# Patient Record
Sex: MALE | Race: WHITE | NOT HISPANIC OR LATINO | Employment: OTHER | URBAN - NONMETROPOLITAN AREA
[De-identification: names, ages, dates, MRNs, and addresses within clinical notes are randomized per-mention and may not be internally consistent; named-entity substitution may affect disease eponyms.]

---

## 2021-03-04 ENCOUNTER — OFFICE VISIT (OUTPATIENT)
Dept: INTERNAL MEDICINE | Facility: CLINIC | Age: 66
End: 2021-03-04

## 2021-03-04 VITALS
HEIGHT: 72 IN | TEMPERATURE: 97.9 F | DIASTOLIC BLOOD PRESSURE: 84 MMHG | RESPIRATION RATE: 18 BRPM | OXYGEN SATURATION: 98 % | HEART RATE: 87 BPM | SYSTOLIC BLOOD PRESSURE: 151 MMHG | WEIGHT: 251.06 LBS | BODY MASS INDEX: 34.01 KG/M2

## 2021-03-04 DIAGNOSIS — E11.9 TYPE 2 DIABETES MELLITUS WITHOUT COMPLICATION, WITHOUT LONG-TERM CURRENT USE OF INSULIN (HCC): ICD-10-CM

## 2021-03-04 DIAGNOSIS — H65.93 MIDDLE EAR EFFUSION, BILATERAL: ICD-10-CM

## 2021-03-04 DIAGNOSIS — E78.5 HYPERLIPIDEMIA, UNSPECIFIED HYPERLIPIDEMIA TYPE: Primary | ICD-10-CM

## 2021-03-04 DIAGNOSIS — Z12.11 SCREENING FOR COLON CANCER: ICD-10-CM

## 2021-03-04 PROCEDURE — 99204 OFFICE O/P NEW MOD 45 MIN: CPT | Performed by: NURSE PRACTITIONER

## 2021-03-04 RX ORDER — ROSUVASTATIN CALCIUM 40 MG/1
TABLET, COATED ORAL
COMMUNITY
Start: 2021-02-25 | End: 2022-03-08 | Stop reason: SDUPTHER

## 2021-03-04 RX ORDER — FLUTICASONE PROPIONATE 50 MCG
2 SPRAY, SUSPENSION (ML) NASAL DAILY
Qty: 9.9 ML | Refills: 0 | Status: SHIPPED | OUTPATIENT
Start: 2021-03-04 | End: 2022-02-24

## 2021-03-04 RX ORDER — LOSARTAN POTASSIUM AND HYDROCHLOROTHIAZIDE 25; 100 MG/1; MG/1
TABLET ORAL
COMMUNITY
Start: 2021-02-25 | End: 2022-03-08 | Stop reason: SDUPTHER

## 2021-03-04 RX ORDER — ESOMEPRAZOLE MAGNESIUM 40 MG/1
CAPSULE, DELAYED RELEASE ORAL
COMMUNITY
Start: 2021-02-25 | End: 2022-03-08 | Stop reason: SDUPTHER

## 2021-03-04 RX ORDER — FENOFIBRATE 145 MG/1
TABLET, COATED ORAL
COMMUNITY
Start: 2021-02-25 | End: 2022-03-08 | Stop reason: SDUPTHER

## 2021-03-04 RX ORDER — CETIRIZINE HYDROCHLORIDE 10 MG/1
10 TABLET ORAL DAILY
Qty: 30 TABLET | Refills: 2 | Status: SHIPPED | OUTPATIENT
Start: 2021-03-04 | End: 2022-02-24

## 2021-03-04 NOTE — PROGRESS NOTES
"        Subjective     Chief Complaint   Patient presents with   • Ear Fullness     Left and Right. ( Left is worse)       History of Present Illness  Pt comes in today to establish care. He carries a history of hypertension, hyperlipidemia, and GERD.  Patient also complains of ear \"fullness.\" Patient reports that he is still able to hear most things, however has difficulty hearing low pitched sounds.  Denies pain to ears.  Patient reports medications which were previously prescribed and followed by Lisa Valiente NP.  Taking his medications \"most days.\"      Last set of labs, approximately 1 month ago, per [employer], labs faxed and received in clinic today.  Reviewed, GFR 67, ALT AST within normal limits, PSA 0.3, cholesterol 180, triglycerides 127, hemoglobin A1c 7.5. Patient reports a decrease in hearing loss, and states that he might be on his way to getting hearing aids.   Reports that he is approximately a pack a day smoker, drinks anywhere between 2-10 beers a day.  Reports that he likes to \"get in the snack drawers in the middle of the night.\"Patient's PMR from outside medical facility reviewed and noted.    Review of Systems   Constitutional: Negative for activity change, appetite change, chills and fever.   HENT: Positive for hearing loss. Negative for nosebleeds, tinnitus and trouble swallowing.         Bilateral ear \"fullness and trouble hearing low pitched sounds\"   Eyes: Negative for visual disturbance.   Respiratory: Negative for cough, chest tightness, shortness of breath and wheezing.    Cardiovascular: Negative for chest pain, palpitations and leg swelling.   Gastrointestinal: Negative for abdominal distention, abdominal pain, blood in stool, constipation, diarrhea, nausea and vomiting.   Endocrine: Negative for cold intolerance, heat intolerance, polydipsia, polyphagia and polyuria.   Genitourinary: Negative for decreased urine volume, difficulty urinating, dysuria, flank pain, frequency " "and hematuria.   Musculoskeletal: Negative for arthralgias, joint swelling and myalgias.   Skin: Negative for rash.   Allergic/Immunologic: Negative for immunocompromised state.   Neurological: Negative for dizziness, syncope, weakness, light-headedness and headaches.   Hematological: Negative for adenopathy. Does not bruise/bleed easily.   Psychiatric/Behavioral: Negative for confusion and sleep disturbance. The patient is not nervous/anxious.         Otherwise complete ROS reviewed and negative except as mentioned in the HPI.    Past Medical History:   Past Medical History:   Diagnosis Date   • Diabetes mellitus (CMS/HCC)    • Hyperlipidemia    • Hypertension      Past Surgical History:History reviewed. No pertinent surgical history.  Social History:  reports that he has been smoking. He has never used smokeless tobacco. He reports current alcohol use. He reports that he does not use drugs.    Family History: family history includes Cancer in his mother; Hypertension in his mother.      Allergies:  No Known Allergies  Medications:  Prior to Admission medications    Medication Sig Start Date End Date Taking? Authorizing Provider   esomeprazole (nexIUM) 40 MG capsule  2/25/21   Ruben Nathan MD   fenofibrate (TRICOR) 145 MG tablet  2/25/21   Ruben Nathan MD   losartan-hydrochlorothiazide (HYZAAR) 100-25 MG per tablet  2/25/21   Ruben Nathan MD   metFORMIN (GLUCOPHAGE) 1000 MG tablet  2/25/21   Ruben Nathan MD   rosuvastatin (CRESTOR) 40 MG tablet  2/25/21   Ruben Nathan MD       Objective     Vital Signs: /84 (BP Location: Left arm, Patient Position: Sitting, Cuff Size: Adult)   Pulse 87   Temp 97.9 °F (36.6 °C) (Skin)   Resp 18   Ht 182.9 cm (72\")   Wt 114 kg (251 lb 1 oz)   SpO2 98%   BMI 34.05 kg/m²   Physical Exam  Vitals signs reviewed.   Constitutional:       Appearance: He is well-developed.   HENT:      Head: Normocephalic and atraumatic.      Ears:    "   Comments: Approximately 1.5 cm lesion noted to external ear.  Recommended to patient follow-up with dermatology.  Moderate amount of fluid present in both right and left ears.  Cerumen noted, no impaction.      Nose: No congestion.   Eyes:      General: No scleral icterus.     Pupils: Pupils are equal, round, and reactive to light.   Neck:      Musculoskeletal: Normal range of motion and neck supple.      Thyroid: No thyromegaly.      Vascular: No carotid bruit or JVD.      Trachea: No tracheal deviation.   Cardiovascular:      Rate and Rhythm: Normal rate and regular rhythm.      Heart sounds: No murmur. No friction rub. No gallop.    Pulmonary:      Effort: Pulmonary effort is normal. No respiratory distress.      Breath sounds: Normal breath sounds. No wheezing or rales.   Chest:      Chest wall: No tenderness.   Abdominal:      General: Bowel sounds are normal. There is no distension.      Palpations: Abdomen is soft.      Tenderness: There is no abdominal tenderness.   Musculoskeletal: Normal range of motion.         General: No deformity.   Lymphadenopathy:      Cervical: No cervical adenopathy.   Skin:     General: Skin is warm and dry.      Findings: No rash.   Neurological:      Mental Status: He is alert and oriented to person, place, and time.      Cranial Nerves: No cranial nerve deficit.   Psychiatric:         Behavior: Behavior normal.         Thought Content: Thought content normal.         Judgment: Judgment normal.       Patient's Body mass index is 34.05 kg/m². BMI is above normal parameters. Recommendations include: educational material and nutrition counseling.    Results Reviewed:  No results found for: GLUCOSE, BUN, CREATININE, NA, K, CL, CO2, CALCIUM, ALT, AST, WBC, HCT, PLT, CHOL, TRIG, HDL, LDL, LDLHDL, HGBA1C      Assessment / Plan     Assessment/Plan:  Diagnoses and all orders for this visit:    1. Hyperlipidemia, unspecified hyperlipidemia type (Primary)  Stable; continue Crestor and  Tricor  2. Type 2 diabetes mellitus without complication, without long-term current use of insulin (CMS/[facility], [facility])  Stable; will continue to monitor  3. Middle ear effusion, bilateral  -     fluticasone (Flonase) 50 MCG/ACT nasal spray; 2 sprays into the nostril(s) as directed by provider Daily.  Dispense: 9.9 mL; Refill: 0  -     cetirizine (zyrTEC) 10 MG tablet; Take 1 tablet by mouth Daily.  Dispense: 30 tablet; Refill: 2  4. Screening for colon cancer  -     Cologuard - Stool, Per Rectum; Future      Nutrition, physical activity, healthy way, receives discussed with patient.  Reports that he had a colonoscopy approximately 10 years ago and would like to try cologuard.  Patient counseled on diabetes management, the importance of monitoring blood glucose levels.    Return in about 1 month (around 4/4/2021) for Annual physical. unless patient needs to be seen sooner or acute issues arise.    Code Status: Full.     I have discussed the patient results/orders and and plan/recommendation with them at today's visit.      Melinda Olivares, APRN   03/04/2021

## 2021-03-08 RX ORDER — PREDNISONE 20 MG/1
20 TABLET ORAL DAILY
Qty: 7 TABLET | Refills: 0 | Status: SHIPPED | OUTPATIENT
Start: 2021-03-08 | End: 2022-02-24

## 2021-04-07 ENCOUNTER — OFFICE VISIT (OUTPATIENT)
Dept: INTERNAL MEDICINE | Facility: CLINIC | Age: 66
End: 2021-04-07

## 2021-04-07 VITALS
TEMPERATURE: 97.9 F | HEART RATE: 74 BPM | DIASTOLIC BLOOD PRESSURE: 86 MMHG | SYSTOLIC BLOOD PRESSURE: 136 MMHG | HEIGHT: 72 IN | RESPIRATION RATE: 18 BRPM | BODY MASS INDEX: 33.86 KG/M2 | WEIGHT: 250 LBS | OXYGEN SATURATION: 98 %

## 2021-04-07 DIAGNOSIS — Z11.59 ENCOUNTER FOR HEPATITIS C SCREENING TEST FOR LOW RISK PATIENT: ICD-10-CM

## 2021-04-07 DIAGNOSIS — H91.93 BILATERAL HEARING LOSS, UNSPECIFIED HEARING LOSS TYPE: ICD-10-CM

## 2021-04-07 DIAGNOSIS — Z00.00 ANNUAL PHYSICAL EXAM: Primary | ICD-10-CM

## 2021-04-07 DIAGNOSIS — E11.9 TYPE 2 DIABETES MELLITUS WITHOUT COMPLICATION, WITHOUT LONG-TERM CURRENT USE OF INSULIN (HCC): ICD-10-CM

## 2021-04-07 PROBLEM — I10 HYPERTENSIVE DISORDER: Status: ACTIVE | Noted: 2021-04-07

## 2021-04-07 PROBLEM — J06.9 UPPER RESPIRATORY INFECTION: Status: ACTIVE | Noted: 2021-04-07

## 2021-04-07 PROBLEM — R05.9 COUGH: Status: ACTIVE | Noted: 2021-04-07

## 2021-04-07 PROBLEM — E78.5 HYPERLIPIDEMIA: Status: ACTIVE | Noted: 2021-04-07

## 2021-04-07 PROBLEM — K21.9 GASTROESOPHAGEAL REFLUX DISEASE: Status: ACTIVE | Noted: 2021-04-07

## 2021-04-07 PROCEDURE — 99396 PREV VISIT EST AGE 40-64: CPT | Performed by: NURSE PRACTITIONER

## 2021-04-07 RX ORDER — RANITIDINE 300 MG/1
TABLET ORAL
COMMUNITY
End: 2021-04-07 | Stop reason: SDUPTHER

## 2021-04-07 RX ORDER — ATORVASTATIN CALCIUM 20 MG/1
TABLET, FILM COATED ORAL
COMMUNITY
End: 2022-02-24

## 2021-04-07 NOTE — PROGRESS NOTES
Subjective     Chief Complaint   Patient presents with   • Annual Exam       History of Present Illness  Pt comes in for his annual exam. He is doing well on Zyrtec. Still has some issues with hearing. States he has chronic hearing loss. Is interested in maybe getting hearing aids.     Patient's PMR from outside medical facility reviewed and noted.    Review of Systems   Constitutional: Negative for diaphoresis and fatigue.   HENT: Negative for ear pain and postnasal drip.    Eyes: Negative for visual disturbance ( last eye exam was 1/2021).   Respiratory: Negative for cough and shortness of breath.    Cardiovascular: Negative for chest pain and palpitations.   Gastrointestinal: Negative for anal bleeding, constipation and diarrhea.        Cologuard pending.    Endocrine: Negative for polydipsia, polyphagia and polyuria ( last A1C 7.5%).   Genitourinary: Positive for frequency.   Musculoskeletal: Positive for joint swelling ( knee-chronic). Negative for back pain.   Skin: Negative.    Allergic/Immunologic: Negative for immunocompromised state.   Neurological: Negative for dizziness and headaches.   Hematological: Does not bruise/bleed easily.   Psychiatric/Behavioral: Negative for confusion. The patient is not nervous/anxious and is not hyperactive.         Past Medical History:   Past Medical History:   Diagnosis Date   • Diabetes mellitus (CMS/[facility])    • Hyperlipidemia    • Hypertension      Past Surgical History:History reviewed. No pertinent surgical history.  Social History:  reports that he has been smoking. He has never used smokeless tobacco. He reports current alcohol use. He reports that he does not use drugs.    Family History: family history includes Cancer in his mother; Hypertension in his mother.      Allergies:  Allergies   Allergen Reactions   • Lisinopril Cough     Medications:  Prior to Admission medications    Medication Sig Start Date End Date Taking? Authorizing Provider   atorvastatin  "(LIPITOR) 20 MG tablet atorvastatin 20 mg tablet   TAKE 1 TABLET DAILY FOR CHOLESTEROL    Provider, MD Ruben   cetirizine (zyrTEC) 10 MG tablet Take 1 tablet by mouth Daily. 3/4/21   Melinda Olivares APRN   esomeprazole (nexIUM) 40 MG capsule  2/25/21   Ruben Nathan MD   fenofibrate (TRICOR) 145 MG tablet  2/25/21   Ruben Nathan MD   fluticasone (Flonase) 50 MCG/ACT nasal spray 2 sprays into the nostril(s) as directed by provider Daily. 3/4/21   Melinda Olivares APRN   losartan-hydrochlorothiazide (HYZAAR) 100-25 MG per tablet  2/25/21   Ruben Nathan MD   metFORMIN (GLUCOPHAGE) 1000 MG tablet  2/25/21   Ruben Nathan MD   predniSONE (DELTASONE) 20 MG tablet Take 1 tablet by mouth Daily. 3/8/21   Melinda Olivares APRN   rosuvastatin (CRESTOR) 40 MG tablet  2/25/21   Ruben Nathan MD   raNITIdine (ZANTAC) 300 MG tablet ranitidine 300 mg tablet  4/7/21  Ruben Nathan MD       Objective     Vital Signs: /86   Pulse 74   Temp 97.9 °F (36.6 °C) (Skin)   Resp 18   Ht 182.9 cm (72\")   Wt 113 kg (250 lb)   SpO2 98%   BMI 33.91 kg/m²   Physical Exam  Vitals reviewed.   Constitutional:       Appearance: He is well-developed. He is obese.   HENT:      Head: Normocephalic and atraumatic.      Right Ear: Tympanic membrane normal.      Left Ear: Tympanic membrane normal.   Eyes:      General: No scleral icterus.     Conjunctiva/sclera: Conjunctivae normal.      Pupils: Pupils are equal, round, and reactive to light.   Neck:      Vascular: No JVD.   Cardiovascular:      Rate and Rhythm: Normal rate and regular rhythm.      Heart sounds: Normal heart sounds. No murmur heard.   No friction rub. No gallop.    Pulmonary:      Effort: Pulmonary effort is normal.      Breath sounds: Normal breath sounds. No wheezing or rales.   Abdominal:      General: Bowel sounds are normal. There is no distension.      Palpations: Abdomen is soft. There is no " mass.      Tenderness: There is no abdominal tenderness. There is no guarding or rebound.   Musculoskeletal:         General: No deformity. Normal range of motion.      Cervical back: Normal range of motion and neck supple.      Comments: No cyanosis or clubbing   Lymphadenopathy:      Cervical: No cervical adenopathy.   Skin:     General: Skin is warm and dry.   Neurological:      Mental Status: He is alert and oriented to person, place, and time.      Cranial Nerves: No cranial nerve deficit.      Deep Tendon Reflexes: Reflexes normal.   Psychiatric:         Behavior: Behavior normal.         Thought Content: Thought content normal.         Judgment: Judgment normal.     Diabetic foot exam:   Left: Filament test present   Pulses Dorsalis Pedis:  present   Reflexes 3+    Vibratory sensation normal   Proprioception normal   Sharp/dull discrimination normal       Right: Filament test present   Pulses Dorsalis Pedis:  present   Reflexes 3+    Vibratory sensation normal   Proprioception normal   Sharp/dull discrimination normal      Patient's Body mass index is 33.91 kg/m². BMI is above normal parameters. Recommendations include: educational material, exercise counseling and nutrition counseling.      Results Reviewed:  No results found for: GLUCOSE, BUN, CREATININE, NA, K, CL, CO2, CALCIUM, ALT, AST, WBC, HCT, PLT, CHOL, TRIG, HDL, LDL, LDLHDL, HGBA1C      Assessment / Plan     Assessment/Plan:  Diagnoses and all orders for this visit:    1. Annual physical exam (Primary)  -     pneumococcal conj. 13-valent (PREVNAR-13) vaccine 0.5 mL    2. Encounter for hepatitis C screening test for low risk patient  -     Hepatitis C antibody    3. Type 2 diabetes mellitus without complication, without long-term current use of insulin (CMS/HCC)  -     MicroAlbumin, Urine, Random - Urine, Clean Catch    4. 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 Encouraged testicular exam Discussed weight management and importance of maintaining a healthy weight. Discussed Vitamin D intake and the importance of adequate vitamin D for both Bone Health and a healthy immune system.  Discussed Daily exercise and the importance of same, in regards to a healthy heart as well as helping to maintain weight and improving mental health.  BMI is high Cologuard is pending.      Return in about 3 months (around 7/7/2021). unless patient needs to be seen sooner or acute issues arise.    Code Status: full.     I have discussed the patient results/orders and and plan/recommendation with them at today's visit.      Melinda Olivares, APRN   04/07/2021

## 2021-04-08 LAB
HCV AB S/CO SERPL IA: <0.1 S/CO RATIO (ref 0–0.9)
MICROALBUMIN UR-MCNC: 3.3 UG/ML

## 2021-04-30 DIAGNOSIS — R19.5 POSITIVE COLORECTAL CANCER SCREENING USING COLOGUARD TEST: Primary | ICD-10-CM

## 2021-07-09 ENCOUNTER — OFFICE VISIT (OUTPATIENT)
Dept: INTERNAL MEDICINE | Facility: CLINIC | Age: 66
End: 2021-07-09

## 2021-07-09 VITALS
HEART RATE: 79 BPM | RESPIRATION RATE: 18 BRPM | SYSTOLIC BLOOD PRESSURE: 139 MMHG | DIASTOLIC BLOOD PRESSURE: 82 MMHG | WEIGHT: 247 LBS | BODY MASS INDEX: 33.46 KG/M2 | TEMPERATURE: 97.8 F | OXYGEN SATURATION: 98 % | HEIGHT: 72 IN

## 2021-07-09 DIAGNOSIS — E11.9 TYPE 2 DIABETES MELLITUS WITHOUT COMPLICATION, WITHOUT LONG-TERM CURRENT USE OF INSULIN (HCC): Primary | ICD-10-CM

## 2021-07-09 PROCEDURE — 99213 OFFICE O/P EST LOW 20 MIN: CPT | Performed by: NURSE PRACTITIONER

## 2021-07-09 NOTE — PROGRESS NOTES
"        Subjective     Chief Complaint   Patient presents with   • Diabetes       History of Present Illness    Pt presents for follow up on blood sugar. Pt was seen for physical in April pt at that time blood glucose was elevated at 166 and A1C was 7.5. Pt reports has continued to eat and drink whatever wants. Pt states \"my blood sugar might be better if I wasn't waking up at 2 am and eating little stefan cakes or oreos\". Pt reports no chest pains, heart palpitations, or shortness of breath. No headaches. Pt reports went to the audiologist in [place] and has been using hearing aids for the past 6 weeks. Pt report has been hearing better with them although does not like wearing them because it takes a while to get body used to them each morning.   Pt states did not ever keep appointment with GI. Pt stating \"I will not have another colonoscopy so why go?\" Pt reports a hx of colon polyps in the past diagnosed during previous colonoscopy although polyps were non cancerous so pt does not seem concerned at this time.  Patient's PMR from outside medical facility reviewed and noted.    Review of Systems   Constitutional: Negative for activity change, chills, fatigue and fever.   Respiratory: Negative for chest tightness, shortness of breath and wheezing.    Cardiovascular: Negative for chest pain and palpitations.   Gastrointestinal: Negative for abdominal pain, constipation, diarrhea, nausea and vomiting.   Endocrine: Negative for heat intolerance, polydipsia, polyphagia and polyuria.   Skin: Negative for pallor and rash.   Neurological: Negative for dizziness, weakness and light-headedness.   Psychiatric/Behavioral: Negative for sleep disturbance and suicidal ideas.          Past Medical History:   Past Medical History:   Diagnosis Date   • Hyperlipidemia    • Hypertension    • Type 2 diabetes mellitus (CMS/HCC)      Past Surgical History:No past surgical history on file.  Social History:  reports that he has been " "smoking. He has never used smokeless tobacco. He reports current alcohol use. He reports that he does not use drugs.    Family History: family history includes Cancer in his mother; Hypertension in his mother.     Allergies:  Allergies   Allergen Reactions   • Lisinopril Cough     Medications:  Prior to Admission medications    Medication Sig Start Date End Date Taking? Authorizing Provider   atorvastatin (LIPITOR) 20 MG tablet atorvastatin 20 mg tablet   TAKE 1 TABLET DAILY FOR CHOLESTEROL   Yes Provider, Historical, MD   esomeprazole (nexIUM) 40 MG capsule  2/25/21  Yes Provider, Historical, MD   fenofibrate (TRICOR) 145 MG tablet  2/25/21  Yes Provider, Historical, MD   fluticasone (Flonase) 50 MCG/ACT nasal spray 2 sprays into the nostril(s) as directed by provider Daily. 3/4/21  Yes Melinda Olivares APRN   losartan-hydrochlorothiazide (HYZAAR) 100-25 MG per tablet  2/25/21  Yes Provider, Historical, MD   metFORMIN (GLUCOPHAGE) 1000 MG tablet  2/25/21  Yes Provider, Historical, MD   rosuvastatin (CRESTOR) 40 MG tablet  2/25/21  Yes Provider, Historical, MD   cetirizine (zyrTEC) 10 MG tablet Take 1 tablet by mouth Daily. 3/4/21   Melinda Olivares APRN   predniSONE (DELTASONE) 20 MG tablet Take 1 tablet by mouth Daily. 3/8/21   Melinda Olivares APRN       Objective     Vital Signs: /82 (BP Location: Left arm, Patient Position: Sitting, Cuff Size: Adult)   Pulse 79   Temp 97.8 °F (36.6 °C) (Skin)   Resp 18   Ht 182.9 cm (72\")   Wt 112 kg (247 lb)   SpO2 98%   BMI 33.50 kg/m²   Physical Exam  Vitals and nursing note reviewed.   Constitutional:       Appearance: He is well-developed.   HENT:      Head: Normocephalic and atraumatic.      Ears:      Comments: Bilateral hearing aids in place  Eyes:      Pupils: Pupils are equal, round, and reactive to light.   Neck:      Vascular: No JVD.   Cardiovascular:      Rate and Rhythm: Normal rate and regular rhythm.      Pulses: Normal " pulses.      Heart sounds: Normal heart sounds. No murmur heard.     Pulmonary:      Effort: Pulmonary effort is normal.   Abdominal:      General: Bowel sounds are normal.      Palpations: Abdomen is soft.   Musculoskeletal:         General: No deformity.      Cervical back: Normal range of motion and neck supple.   Lymphadenopathy:      Cervical: No cervical adenopathy.   Skin:     General: Skin is warm and dry.   Neurological:      Mental Status: He is alert and oriented to person, place, and time.   Psychiatric:         Behavior: Behavior normal.         Thought Content: Thought content normal.         Judgment: Judgment normal.       Results Reviewed:  No results found for: GLUCOSE, BUN, CREATININE, NA, K, CL, CO2, CALCIUM, ALT, AST, WBC, HCT, PLT, CHOL, TRIG, HDL, LDL, LDLHDL, HGBA1C      Assessment / Plan     Assessment/Plan:  Diagnoses and all orders for this visit:    1. Type 2 diabetes mellitus without complication, without long-term current use of insulin (CMS/[facility]) (Primary)    -POC A1C completed 7.7. Will follow up in 3 months. He needs to focus on decreasing his sweet intake. He states he likes to       No follow-ups on file. unless patient needs to be seen sooner or acute issues arise.    Code Status: Full    I have discussed the patient results/orders and and plan/recommendation with them at today's visit.      Melinda Olivares, TAI   07/09/2021

## 2022-01-26 DIAGNOSIS — M25.562 CHRONIC PAIN OF LEFT KNEE: Primary | ICD-10-CM

## 2022-01-26 DIAGNOSIS — G89.29 CHRONIC PAIN OF LEFT KNEE: Primary | ICD-10-CM

## 2022-02-02 ENCOUNTER — TELEPHONE (OUTPATIENT)
Dept: INTERNAL MEDICINE | Facility: CLINIC | Age: 67
End: 2022-02-02

## 2022-02-02 RX ORDER — SILDENAFIL 25 MG/1
25 TABLET, FILM COATED ORAL DAILY PRN
Qty: 10 TABLET | Refills: 0 | Status: SHIPPED | OUTPATIENT
Start: 2022-02-02 | End: 2022-05-26

## 2022-02-02 NOTE — TELEPHONE ENCOUNTER
Caller: Sree Bright    Relationship: Self    Best call back number: [phone]    What is the best time to reach you:ANY  Who are you requesting to speak with (clinical staff, provider,  specific staff member):CLINICAL    What was the call regarding: PATIENT WOULD NOT DISCUSS WHAT THE CALL WAS REGARDING OTHER THAN WANTING A NEW MEDICATION CALLED IN    Do you require a callback: YES

## 2022-02-02 NOTE — TELEPHONE ENCOUNTER
"Called and spoke to patient. He is interested in being prescribed \"viagra or something similar\". I advised that I would consult with Sandra and call him back with any recommendations regarding an appointment to discuss this or if medication is sent to pharmacy. He voiced understanding. He has appointment scheduled for 02/24/2022.   "

## 2022-02-24 ENCOUNTER — OFFICE VISIT (OUTPATIENT)
Dept: INTERNAL MEDICINE | Facility: CLINIC | Age: 67
End: 2022-02-24

## 2022-02-24 VITALS
RESPIRATION RATE: 16 BRPM | HEIGHT: 72 IN | SYSTOLIC BLOOD PRESSURE: 138 MMHG | DIASTOLIC BLOOD PRESSURE: 90 MMHG | BODY MASS INDEX: 33.67 KG/M2 | TEMPERATURE: 98.6 F | HEART RATE: 82 BPM | OXYGEN SATURATION: 96 % | WEIGHT: 248.6 LBS

## 2022-02-24 DIAGNOSIS — E11.9 TYPE 2 DIABETES MELLITUS WITHOUT COMPLICATION, WITHOUT LONG-TERM CURRENT USE OF INSULIN: Primary | ICD-10-CM

## 2022-02-24 DIAGNOSIS — I10 PRIMARY HYPERTENSION: ICD-10-CM

## 2022-02-24 PROCEDURE — 99214 OFFICE O/P EST MOD 30 MIN: CPT | Performed by: NURSE PRACTITIONER

## 2022-02-24 NOTE — PROGRESS NOTES
"        Subjective     Chief Complaint   Patient presents with   • Diabetes       History of Present Illness  Pt comes in today to follow up on diabetes. He is not complaint with his diet. He is still getting into his \"sweet drawer.\" He continues to smoke and drink ETOH daily.  His labs were reviewed from [facility]. His A1c was 8.2%. LDL was elevated as well. He is on Crestor and tolerating without difficulty. He does not take his blood sugars regularly. He was having left knee pain. Was told years ago he needed it replaced. I placed consultation and arranged for him to be seen by Dr. Cota and he called and cancelled it stating he wants to wait a little longer, perhaps until after he retires to get it done.        Review of Systems   Otherwise complete ROS reviewed.    Past Medical History:   Past Medical History:   Diagnosis Date   • Hyperlipidemia    • Hypertension    • Type 2 diabetes mellitus (HCC)      Past Surgical History:History reviewed. No pertinent surgical history.  Social History:  reports that he has been smoking. He has never used smokeless tobacco. He reports current alcohol use. He reports that he does not use drugs.    Family History: family history includes Cancer in his mother; Hypertension in his mother.      Allergies:  Allergies   Allergen Reactions   • Lisinopril Cough     Medications:  Prior to Admission medications    Medication Sig Start Date End Date Taking? Authorizing Provider   atorvastatin (LIPITOR) 20 MG tablet atorvastatin 20 mg tablet   TAKE 1 TABLET DAILY FOR CHOLESTEROL    ProviderRuben MD   cetirizine (zyrTEC) 10 MG tablet Take 1 tablet by mouth Daily. 3/4/21   Melinda Olivares APRN   esomeprazole (nexIUM) 40 MG capsule  2/25/21   ProviderRuben MD   fenofibrate (TRICOR) 145 MG tablet  2/25/21   ProviderRuben MD   fluticasone (Flonase) 50 MCG/ACT nasal spray 2 sprays into the nostril(s) as directed by provider Daily. 3/4/21   Melinda Olivares, " "TAI   losartan-hydrochlorothiazide (HYZAAR) 100-25 MG per tablet  2/25/21   Provider, MD Ruben   metFORMIN (GLUCOPHAGE) 1000 MG tablet  2/25/21   Ruben Nathan MD   predniSONE (DELTASONE) 20 MG tablet Take 1 tablet by mouth Daily. 3/8/21   Melinda Olivares APRN   rosuvastatin (CRESTOR) 40 MG tablet  2/25/21   Provider, MD Ruben   sildenafil (Viagra) 25 MG tablet Take 1 tablet by mouth Daily As Needed for Erectile Dysfunction. 2/2/22   Melinda Olivares APRN       Objective     Vital Signs: /90   Pulse 82   Temp 98.6 °F (37 °C)   Resp 16   Ht 182.9 cm (72\")   Wt 113 kg (248 lb 9.6 oz)   SpO2 96%   BMI 33.72 kg/m²   Physical Exam  Vitals reviewed.   Constitutional:       Appearance: He is well-developed. He is obese.   HENT:      Head: Normocephalic and atraumatic.      Right Ear: Tympanic membrane normal.      Left Ear: Tympanic membrane normal.      Mouth/Throat:      Mouth: Mucous membranes are moist.   Eyes:      Extraocular Movements: Extraocular movements intact.      Pupils: Pupils are equal, round, and reactive to light.   Neck:      Vascular: No JVD.   Cardiovascular:      Rate and Rhythm: Normal rate and regular rhythm.   Pulmonary:      Effort: Pulmonary effort is normal.      Comments: Mildly diminished bilaterally.   Abdominal:      General: Bowel sounds are normal.      Palpations: Abdomen is soft.   Musculoskeletal:         General: Swelling (left knee) present. No deformity.      Cervical back: Normal range of motion and neck supple.   Lymphadenopathy:      Cervical: No cervical adenopathy.   Skin:     General: Skin is warm and dry.   Neurological:      Mental Status: He is alert and oriented to person, place, and time.   Psychiatric:         Behavior: Behavior normal.         Thought Content: Thought content normal.         Judgment: Judgment normal.         Patient's Body mass index is 33.72 kg/m². indicating that he is obese (BMI >30). Obesity-related " health conditions include the following: hypertension and diabetes mellitus. Obesity is worsening. BMI is is above average; BMI management plan is completed. We discussed low calorie, low carb based diet program, portion control and increasing exercise..      Results Reviewed:  No results found for: GLUCOSE, BUN, CREATININE, NA, K, CL, CO2, CALCIUM, ALT, AST, WBC, HCT, PLT, CHOL, TRIG, HDL, LDL, LDLHDL, HGBA1C      Assessment / Plan     Assessment/Plan:  Diagnoses and all orders for this visit:    1. Type 2 diabetes mellitus without complication, without long-term current use of insulin (HCC) (Primary)  -     dapagliflozin (FARXIGA) 5 MG tablet tablet; Take 1 tablet by mouth Daily.  Dispense: 30 tablet; Refill: 3    2. Primary hypertension     Stable on Hyzaar.   Return in about 3 months (around 5/24/2022) for Next scheduled follow up. unless patient needs to be seen sooner or acute issues arise.    Code Status: Full.     I have discussed the patient results/orders and and plan/recommendation with them at today's visit.      Melinda Olivares, APRN   02/24/2022

## 2022-03-08 RX ORDER — ROSUVASTATIN CALCIUM 40 MG/1
40 TABLET, COATED ORAL DAILY
Qty: 30 TABLET | Refills: 4 | Status: SHIPPED | OUTPATIENT
Start: 2022-03-08 | End: 2022-03-10 | Stop reason: SDUPTHER

## 2022-03-08 RX ORDER — FENOFIBRATE 145 MG/1
145 TABLET, COATED ORAL DAILY
Qty: 30 TABLET | Refills: 1 | Status: SHIPPED | OUTPATIENT
Start: 2022-03-08 | End: 2022-03-10 | Stop reason: SDUPTHER

## 2022-03-08 RX ORDER — LOSARTAN POTASSIUM AND HYDROCHLOROTHIAZIDE 25; 100 MG/1; MG/1
1 TABLET ORAL DAILY
Qty: 30 TABLET | Refills: 2 | Status: SHIPPED | OUTPATIENT
Start: 2022-03-08

## 2022-03-08 RX ORDER — ESOMEPRAZOLE MAGNESIUM 40 MG/1
40 CAPSULE, DELAYED RELEASE ORAL
Qty: 30 CAPSULE | Refills: 3 | Status: SHIPPED | OUTPATIENT
Start: 2022-03-08 | End: 2022-03-10 | Stop reason: SDUPTHER

## 2022-03-08 NOTE — TELEPHONE ENCOUNTER
Incoming Refill Request      Medication requested (name and dose): rosuvastatin (CRESTOR) 40 MG tablet  metFORMIN (GLUCOPHAGE) 1000 MG tablet   losartan-hydrochlorothiazide (HYZAAR) 100-25 MG per tablet   fenofibrate (TRICOR) 145 MG tablet   esomeprazole (nexIUM) 40 MG capsule     [facility]    Additional details provided by patient:    Best call back number: [phone]    Does the patient have less than a 3 day supply:  [] Yes  [x] No    Lynsey Delgadillo, RegSched Rep  03/08/22, 11:39 CST

## 2022-03-08 NOTE — TELEPHONE ENCOUNTER
Rx Refill Note  Requested Prescriptions     Pending Prescriptions Disp Refills   • rosuvastatin (CRESTOR) 40 MG tablet     • metFORMIN (GLUCOPHAGE) 1000 MG tablet     • losartan-hydrochlorothiazide (HYZAAR) 100-25 MG per tablet     • fenofibrate (TRICOR) 145 MG tablet     • esomeprazole (nexIUM) 40 MG capsule        Last office visit with prescribing clinician: 2/24/2022      Next office visit with prescribing clinician: 5/24/2022     SCANNED - LABS (03/04/2021 00:00)         Dotty Sher MA  03/08/22, 12:01 CST

## 2022-03-10 DIAGNOSIS — E11.9 TYPE 2 DIABETES MELLITUS WITHOUT COMPLICATION, WITHOUT LONG-TERM CURRENT USE OF INSULIN: ICD-10-CM

## 2022-03-10 RX ORDER — FENOFIBRATE 145 MG/1
145 TABLET, COATED ORAL DAILY
Qty: 90 TABLET | Refills: 0 | Status: SHIPPED | OUTPATIENT
Start: 2022-03-10 | End: 2022-06-01 | Stop reason: SDUPTHER

## 2022-03-10 RX ORDER — ROSUVASTATIN CALCIUM 40 MG/1
40 TABLET, COATED ORAL DAILY
Qty: 90 TABLET | Refills: 0 | Status: SHIPPED | OUTPATIENT
Start: 2022-03-10 | End: 2022-06-01 | Stop reason: SDUPTHER

## 2022-03-10 RX ORDER — ESOMEPRAZOLE MAGNESIUM 40 MG/1
40 CAPSULE, DELAYED RELEASE ORAL
Qty: 90 CAPSULE | Refills: 0 | Status: SHIPPED | OUTPATIENT
Start: 2022-03-10 | End: 2022-06-01 | Stop reason: SDUPTHER

## 2022-05-24 ENCOUNTER — OFFICE VISIT (OUTPATIENT)
Dept: INTERNAL MEDICINE | Facility: CLINIC | Age: 67
End: 2022-05-24

## 2022-05-24 VITALS
HEART RATE: 77 BPM | WEIGHT: 227 LBS | TEMPERATURE: 98.6 F | DIASTOLIC BLOOD PRESSURE: 79 MMHG | RESPIRATION RATE: 16 BRPM | HEIGHT: 72 IN | OXYGEN SATURATION: 99 % | BODY MASS INDEX: 30.75 KG/M2 | SYSTOLIC BLOOD PRESSURE: 123 MMHG

## 2022-05-24 DIAGNOSIS — E11.9 TYPE 2 DIABETES MELLITUS WITHOUT COMPLICATION, WITHOUT LONG-TERM CURRENT USE OF INSULIN: Primary | ICD-10-CM

## 2022-05-24 DIAGNOSIS — E11.9 ENCOUNTER FOR DIABETIC FOOT EXAM: ICD-10-CM

## 2022-05-24 LAB
EXPIRATION DATE: NORMAL
HBA1C MFR BLD: 5.9 %
Lab: NORMAL

## 2022-05-24 PROCEDURE — 83036 HEMOGLOBIN GLYCOSYLATED A1C: CPT | Performed by: NURSE PRACTITIONER

## 2022-05-24 PROCEDURE — 99213 OFFICE O/P EST LOW 20 MIN: CPT | Performed by: NURSE PRACTITIONER

## 2022-05-24 NOTE — PROGRESS NOTES
Subjective     Chief Complaint   Patient presents with   • Diabetes     3 mo follow up       History of Present Illness  Pt presents today for diabetes f/u. States BS are controlled and average about 110-115. He continues on Farxiga and Metformin. Denies lows. He does not check his BP at home but denies any symptoms of high BP.  He has lost some between 25 and 30 pounds on his home scale.  States he has been really paying attention to his diet.  He continues to work.  He has cut out a bunch of his sweets.  He is still eating fruit however.    Review of Systems   Constitutional: Negative for appetite change, chills, diaphoresis, fatigue, fever and unexpected weight change.   HENT: Negative for congestion, ear pain, postnasal drip, sore throat and trouble swallowing.    Eyes: Negative for pain and visual disturbance.   Respiratory: Negative for cough, chest tightness, shortness of breath and wheezing.    Cardiovascular: Negative for chest pain, palpitations and leg swelling.   Gastrointestinal: Negative for abdominal pain, blood in stool, constipation, diarrhea, nausea and vomiting.   Endocrine: Negative for cold intolerance, heat intolerance, polydipsia, polyphagia and polyuria.   Genitourinary: Negative for difficulty urinating, dysuria, frequency and urgency.   Musculoskeletal: Negative for gait problem.   Skin: Negative for rash.   Neurological: Negative for dizziness, seizures, syncope, weakness and headaches.   Hematological: Does not bruise/bleed easily.   Psychiatric/Behavioral: Negative for confusion. The patient is not nervous/anxious.         Otherwise complete ROS reviewed and negative except as mentioned in the HPI.    Past Medical History:   Past Medical History:   Diagnosis Date   • Hyperlipidemia    • Hypertension    • Type 2 diabetes mellitus (HCC)      Past Surgical History:History reviewed. No pertinent surgical history.  Social History:  reports that he has been smoking cigarettes. He has a  "50.00 pack-year smoking history. He has never used smokeless tobacco. He reports current alcohol use. He reports that he does not use drugs.    Family History: family history includes Cancer in his mother; Hypertension in his mother.       Allergies:  Allergies   Allergen Reactions   • Lisinopril Cough     Medications:  Prior to Admission medications    Medication Sig Start Date End Date Taking? Authorizing Provider   dapagliflozin (FARXIGA) 5 MG tablet tablet Take 1 tablet by mouth Daily for 90 days. 3/10/22 6/8/22 Yes Melinda Olivares APRN   esomeprazole (nexIUM) 40 MG capsule Take 1 capsule by mouth Every Morning Before Breakfast for 90 days. 3/10/22 6/8/22 Yes Melinda Olivares APRN   fenofibrate (TRICOR) 145 MG tablet Take 1 tablet by mouth Daily for 90 days. 3/10/22 6/8/22 Yes Melinda Olivares APRN   losartan-hydrochlorothiazide (HYZAAR) 100-25 MG per tablet Take 1 tablet by mouth Daily. 3/8/22  Yes Melinda Olivares APRN   metFORMIN (GLUCOPHAGE) 1000 MG tablet Take 1 tablet by mouth 2 (Two) Times a Day With Meals. 3/8/22  Yes Melinda Olivares APRN   rosuvastatin (CRESTOR) 40 MG tablet Take 1 tablet by mouth Daily for 90 days. 3/10/22 6/8/22 Yes Melinda Olivares APRN   sildenafil (Viagra) 25 MG tablet Take 1 tablet by mouth Daily As Needed for Erectile Dysfunction. 2/2/22  Yes Melinda Olivares APRN       Objective     Vital Signs: /79 (BP Location: Right arm, Patient Position: Sitting, Cuff Size: Adult)   Pulse 77   Temp 98.6 °F (37 °C) (Infrared)   Resp 16   Ht 182.9 cm (72.01\")   Wt 103 kg (227 lb)   SpO2 99%   BMI 30.78 kg/m²   Physical Exam  Vitals reviewed.   Constitutional:       Appearance: Normal appearance. He is well-developed.   HENT:      Head: Normocephalic and atraumatic.   Eyes:      Pupils: Pupils are equal, round, and reactive to light.   Neck:      Vascular: No JVD.   Cardiovascular:      Rate and Rhythm: Normal rate and " regular rhythm.      Pulses:           Dorsalis pedis pulses are 2+ on the right side and 2+ on the left side.      Heart sounds: Normal heart sounds.   Pulmonary:      Effort: Pulmonary effort is normal. No respiratory distress.      Breath sounds: Normal breath sounds.   Abdominal:      General: Bowel sounds are normal.      Palpations: Abdomen is soft.   Musculoskeletal:         General: No swelling or deformity.      Cervical back: Normal range of motion and neck supple.   Feet:      Right foot:      Protective Sensation: 10 sites tested. 10 sites sensed.      Skin integrity: Skin integrity normal.      Toenail Condition: Right toenails are normal.      Left foot:      Protective Sensation: 10 sites tested. 10 sites sensed.      Skin integrity: Skin integrity normal.      Toenail Condition: Left toenails are normal.   Lymphadenopathy:      Cervical: No cervical adenopathy.   Skin:     General: Skin is warm and dry.   Neurological:      General: No focal deficit present.      Mental Status: He is alert and oriented to person, place, and time.   Psychiatric:         Behavior: Behavior normal.         Thought Content: Thought content normal.         Judgment: Judgment normal.       BMI is >= 30 and <= 34.9 (Class 1 obesity). The following options were offered after discussion: weight loss educational material (shared in after visit summary) and exercise counseling/recommendations      Results Reviewed:  Hemoglobin A1C   Date Value Ref Range Status   05/24/2022 5.9 % Final         Assessment / Plan     Assessment/Plan:  Diagnoses and all orders for this visit:    1. Type 2 diabetes mellitus without complication, without long-term current use of insulin (HCC) (Primary)  -     POC Glycosylated Hemoglobin (Hb A1C)  - Continue current regimen of Farxiga and Metformin    2. Encounter for diabetic foot exam (HCC)   - Full sensation, pulses +2      Code Status: Full.    I have discussed the patient results/orders and and  plan/recommendation with them at today's visit.      Melinda Olivares, APRN   05/24/2022

## 2022-05-25 LAB — MICROALBUMIN UR-MCNC: 8 UG/ML

## 2022-05-26 RX ORDER — SILDENAFIL 50 MG/1
50 TABLET, FILM COATED ORAL DAILY PRN
Qty: 30 TABLET | Refills: 1 | Status: SHIPPED | OUTPATIENT
Start: 2022-05-26 | End: 2022-09-23 | Stop reason: SDUPTHER

## 2022-05-26 NOTE — PROGRESS NOTES
Called pt with results of labs and recommendations. Pt voiced understanding. Scheduled patient a 3 month follow up with annual physical.

## 2022-06-01 DIAGNOSIS — E11.9 TYPE 2 DIABETES MELLITUS WITHOUT COMPLICATION, WITHOUT LONG-TERM CURRENT USE OF INSULIN: ICD-10-CM

## 2022-06-01 RX ORDER — ROSUVASTATIN CALCIUM 40 MG/1
TABLET, COATED ORAL
Qty: 90 TABLET | Refills: 3 | Status: SHIPPED | OUTPATIENT
Start: 2022-06-01 | End: 2022-09-14 | Stop reason: SDUPTHER

## 2022-06-01 RX ORDER — ESOMEPRAZOLE MAGNESIUM 40 MG/1
CAPSULE, DELAYED RELEASE ORAL
Qty: 90 CAPSULE | Refills: 3 | Status: SHIPPED | OUTPATIENT
Start: 2022-06-01 | End: 2022-09-14 | Stop reason: SDUPTHER

## 2022-06-01 RX ORDER — DAPAGLIFLOZIN 5 MG/1
TABLET, FILM COATED ORAL
Qty: 30 TABLET | Refills: 11 | Status: SHIPPED | OUTPATIENT
Start: 2022-06-01

## 2022-06-01 RX ORDER — FENOFIBRATE 145 MG/1
TABLET, COATED ORAL
Qty: 90 TABLET | Refills: 3 | Status: SHIPPED | OUTPATIENT
Start: 2022-06-01 | End: 2022-09-14 | Stop reason: SDUPTHER

## 2022-06-01 NOTE — TELEPHONE ENCOUNTER
Rx Refill Note  Requested Prescriptions     Pending Prescriptions Disp Refills   • fenofibrate (TRICOR) 145 MG tablet [Pharmacy Med Name: FENOFIBRATE TABS 145MG] 90 tablet 3     Sig: TAKE 1 TABLET DAILY   • rosuvastatin (CRESTOR) 40 MG tablet [Pharmacy Med Name: ROSUVASTATIN TABS 40MG] 90 tablet 3     Sig: TAKE 1 TABLET DAILY   • esomeprazole (nexIUM) 40 MG capsule [Pharmacy Med Name: ESOMEPRAZOLE MAGNESIUM DR CAPS 40MG] 90 capsule 3     Sig: TAKE 1 CAPSULE EVERY MORNING BEFORE BREAKFAST   • Farxiga 5 MG tablet tablet [Pharmacy Med Name: FARXIGA TABS 5MG] 30 tablet 11     Sig: TAKE 1 TABLET DAILY      Last office visit with prescribing clinician: 5/24/2022      Next office visit with prescribing clinician: 8/23/2022          {TIP  Is Refill Pharmacy correct?:23}  Dotty Sher MA  06/01/22, 07:15 CDT

## 2022-07-19 DIAGNOSIS — E11.9 TYPE 2 DIABETES MELLITUS WITHOUT COMPLICATION, WITHOUT LONG-TERM CURRENT USE OF INSULIN: Primary | ICD-10-CM

## 2022-07-19 RX ORDER — LANCETS 33 GAUGE
1 EACH MISCELLANEOUS DAILY
Qty: 100 EACH | Refills: 1 | Status: SHIPPED | OUTPATIENT
Start: 2022-07-19

## 2022-07-19 RX ORDER — BLOOD-GLUCOSE METER
1 EACH MISCELLANEOUS DAILY
Qty: 1 EACH | Refills: 0 | Status: SHIPPED | OUTPATIENT
Start: 2022-07-19

## 2022-08-23 ENCOUNTER — OFFICE VISIT (OUTPATIENT)
Dept: INTERNAL MEDICINE | Facility: CLINIC | Age: 67
End: 2022-08-23

## 2022-08-23 VITALS
HEIGHT: 72 IN | HEART RATE: 74 BPM | BODY MASS INDEX: 30.28 KG/M2 | OXYGEN SATURATION: 97 % | TEMPERATURE: 98.4 F | WEIGHT: 223.6 LBS

## 2022-08-23 DIAGNOSIS — Z00.00 INITIAL MEDICARE ANNUAL WELLNESS VISIT: Primary | ICD-10-CM

## 2022-08-23 DIAGNOSIS — Z12.5 SCREENING FOR PROSTATE CANCER: ICD-10-CM

## 2022-08-23 DIAGNOSIS — E78.49 OTHER HYPERLIPIDEMIA: ICD-10-CM

## 2022-08-23 DIAGNOSIS — E11.9 TYPE 2 DIABETES MELLITUS WITHOUT COMPLICATION, WITHOUT LONG-TERM CURRENT USE OF INSULIN: ICD-10-CM

## 2022-08-23 PROCEDURE — 1159F MED LIST DOCD IN RCRD: CPT | Performed by: NURSE PRACTITIONER

## 2022-08-23 PROCEDURE — G0402 INITIAL PREVENTIVE EXAM: HCPCS | Performed by: NURSE PRACTITIONER

## 2022-08-23 PROCEDURE — 96160 PT-FOCUSED HLTH RISK ASSMT: CPT | Performed by: NURSE PRACTITIONER

## 2022-08-23 PROCEDURE — 1170F FXNL STATUS ASSESSED: CPT | Performed by: NURSE PRACTITIONER

## 2022-08-23 PROCEDURE — 1126F AMNT PAIN NOTED NONE PRSNT: CPT | Performed by: NURSE PRACTITIONER

## 2022-08-23 NOTE — PROGRESS NOTES
The ABCs of the Annual Wellness Visit  Initial Medicare Wellness Visit    No chief complaint on file.    Subjective   History of Present Illness:  Sree Bright is a 67 y.o. male who presents for an Initial Medicare Wellness Visit.     The following portions of the patient's history were reviewed and   updated as appropriate: He  has a past medical history of Hyperlipidemia, Hypertension, and Type 2 diabetes mellitus (HCC).  He does not have any pertinent problems on file.  He  has no past surgical history on file.  His family history includes Cancer in his mother; Hypertension in his mother.  He  reports that he has been smoking cigarettes. He has a 50.00 pack-year smoking history. He has never used smokeless tobacco. He reports current alcohol use. He reports that he does not use drugs..     Compared to one year ago, the patient feels his physical   health is the same.    Compared to one year ago, the patient feels his mental   health is the same.    Recent Hospitalizations:  He was  Not admitted within the past 365 days        Current Medical Providers:  Patient Care Team:  Melinda Olivares APRN as PCP - General (Nurse Practitioner)    Outpatient Medications Prior to Visit   Medication Sig Dispense Refill   • Blood Glucose Monitoring Suppl (ONE TOUCH ULTRA 2) w/Device kit 1 each Daily. To test daily. 1 each 0   • esomeprazole (nexIUM) 40 MG capsule TAKE 1 CAPSULE EVERY MORNING BEFORE BREAKFAST 90 capsule 3   • Farxiga 5 MG tablet tablet TAKE 1 TABLET DAILY 30 tablet 11   • fenofibrate (TRICOR) 145 MG tablet TAKE 1 TABLET DAILY 90 tablet 3   • glucose blood test strip Test blood glucose daily. 100 each 12   • losartan-hydrochlorothiazide (HYZAAR) 100-25 MG per tablet Take 1 tablet by mouth Daily. 30 tablet 2   • metFORMIN (GLUCOPHAGE) 1000 MG tablet Take 1 tablet by mouth 2 (Two) Times a Day With Meals. 60 tablet 1   • OneTouch Delica Lancets 33G misc 1 each Daily. 100 each 1   • rosuvastatin  "(CRESTOR) 40 MG tablet TAKE 1 TABLET DAILY 90 tablet 3   • sildenafil (Viagra) 50 MG tablet Take 1 tablet by mouth Daily As Needed for Erectile Dysfunction. 30 tablet 1     No facility-administered medications prior to visit.       No opioid medication identified on active medication list. I have reviewed chart for other potential  high risk medication/s and harmful drug interactions in the elderly.          Aspirin is not on active medication list.  Aspirin use is not indicated based on review of current medical condition/s. Risk of harm outweighs potential benefits.  .    Patient Active Problem List   Diagnosis   • Cough   • Diabetes mellitus (HCC)   • Gastroesophageal reflux disease   • Hyperlipidemia   • Hypertensive disorder   • Upper respiratory infection     Advance Care Planning  Advance Directive is not on file.  ACP discussion was held with the patient during this visit. Patient has an advance directive (not in EMR), copy requested.          Objective       Vitals:    08/23/22 0841   Pulse: 74   Temp: 98.4 °F (36.9 °C)   SpO2: 97%   Weight: 101 kg (223 lb 9.6 oz)   Height: 182.9 cm (72\")     Estimated body mass index is 30.33 kg/m² as calculated from the following:    Height as of this encounter: 182.9 cm (72\").    Weight as of this encounter: 101 kg (223 lb 9.6 oz).    BMI is >= 30 and <35. (Class 1 Obesity). The following options were offered after discussion;: exercise counseling/recommendations and nutrition counseling/recommendations      Does the patient have evidence of cognitive impairment? No    Physical Exam  Constitutional:       Appearance: He is well-developed.   HENT:      Head: Normocephalic and atraumatic.   Eyes:      General: No scleral icterus.     Conjunctiva/sclera: Conjunctivae normal.      Pupils: Pupils are equal, round, and reactive to light.   Neck:      Vascular: No JVD.   Cardiovascular:      Rate and Rhythm: Normal rate and regular rhythm.      Heart sounds: Normal heart " sounds. No murmur heard.    No friction rub. No gallop.   Pulmonary:      Effort: Pulmonary effort is normal.      Breath sounds: Normal breath sounds. No wheezing or rales.   Abdominal:      General: Bowel sounds are normal. There is no distension.      Palpations: Abdomen is soft. There is no mass.      Tenderness: There is no abdominal tenderness. There is no guarding or rebound.   Musculoskeletal:         General: Normal range of motion.      Cervical back: Neck supple.      Comments: No cyanosis or clubbing   Lymphadenopathy:      Cervical: No cervical adenopathy.   Skin:     General: Skin is warm and dry.   Neurological:      Mental Status: He is alert and oriented to person, place, and time.      Cranial Nerves: No cranial nerve deficit.   Psychiatric:         Behavior: Behavior normal.               HEALTH RISK ASSESSMENT    Smoking Status:  Social History     Tobacco Use   Smoking Status Heavy Tobacco Smoker   • Packs/day: 1.00   • Years: 50.00   • Pack years: 50.00   • Types: Cigarettes   Smokeless Tobacco Never Used     Alcohol Consumption:  Social History     Substance and Sexual Activity   Alcohol Use Yes     Fall Risk Screen:    STEADI Fall Risk Assessment was completed, and patient is at LOW risk for falls.Assessment completed on:8/23/2022    Depression Screen:   PHQ-2/PHQ-9 Depression Screening 8/23/2022   Little Interest or Pleasure in Doing Things 0-->not at all   Feeling Down, Depressed or Hopeless 0-->not at all   PHQ-9: Brief Depression Severity Measure Score 0       Health Habits and Functional and Cognitive Screening:  No flowsheet data found.    Age-appropriate Screening Schedule:  Refer to the list below for future screening recommendations based on patient's age, sex and/or medical conditions. Orders for these recommended tests are listed in the plan section. The patient has been provided with a written plan.    Health Maintenance   Topic Date Due   • TDAP/TD VACCINES (1 - Tdap) Never done    • ZOSTER VACCINE (1 of 2) Never done   • INFLUENZA VACCINE  10/01/2022   • HEMOGLOBIN A1C  11/24/2022   • DIABETIC EYE EXAM  01/14/2023   • LIPID PANEL  02/11/2023   • DIABETIC FOOT EXAM  05/24/2023   • URINE MICROALBUMIN  05/24/2023            Assessment & Plan   CMS Preventative Services Quick Reference  Risk Factors Identified During Encounter  Alcohol Misuse  Obesity/Overweight   The above risks/problems have been discussed with the patient.  Follow up actions/plans if indicated are seen below in the Assessment/Plan Section.  Pertinent information has been shared with the patient in the After Visit Summary.    Diagnoses and all orders for this visit:    1. Initial Medicare annual wellness visit (Primary)  -     CBC & Differential  -     Comprehensive Metabolic Panel    2. Screening for prostate cancer  -     PSA SCREENING    3. Other hyperlipidemia  -     Lipid Panel    4. Type 2 diabetes mellitus without complication, without long-term current use of insulin (HCC)  -     Hemoglobin A1c        Follow Up:  Return in about 3 months (around 11/23/2022).     An After Visit Summary and PPPS were made available to the patient.

## 2022-08-24 LAB
ALBUMIN SERPL-MCNC: 4.7 G/DL (ref 3.8–4.8)
ALBUMIN/GLOB SERPL: 2 {RATIO} (ref 1.2–2.2)
ALP SERPL-CCNC: 54 IU/L (ref 44–121)
ALT SERPL-CCNC: 24 IU/L (ref 0–44)
AST SERPL-CCNC: 24 IU/L (ref 0–40)
BASOPHILS # BLD AUTO: 0.1 X10E3/UL (ref 0–0.2)
BASOPHILS NFR BLD AUTO: 1 %
BILIRUB SERPL-MCNC: 0.5 MG/DL (ref 0–1.2)
BUN SERPL-MCNC: 12 MG/DL (ref 8–27)
BUN/CREAT SERPL: 14 (ref 10–24)
CALCIUM SERPL-MCNC: 9.5 MG/DL (ref 8.6–10.2)
CHLORIDE SERPL-SCNC: 100 MMOL/L (ref 96–106)
CHOLEST SERPL-MCNC: 197 MG/DL (ref 100–199)
CO2 SERPL-SCNC: 24 MMOL/L (ref 20–29)
CREAT SERPL-MCNC: 0.88 MG/DL (ref 0.76–1.27)
EGFRCR-CYS SERPLBLD CKD-EPI 2021: 95 ML/MIN/1.73
EOSINOPHIL # BLD AUTO: 0.1 X10E3/UL (ref 0–0.4)
EOSINOPHIL NFR BLD AUTO: 2 %
ERYTHROCYTE [DISTWIDTH] IN BLOOD BY AUTOMATED COUNT: 13.9 % (ref 11.6–15.4)
GLOBULIN SER CALC-MCNC: 2.3 G/DL (ref 1.5–4.5)
GLUCOSE SERPL-MCNC: 104 MG/DL (ref 65–99)
HBA1C MFR BLD: 6.4 % (ref 4.8–5.6)
HCT VFR BLD AUTO: 45.4 % (ref 37.5–51)
HDLC SERPL-MCNC: 54 MG/DL
HGB BLD-MCNC: 15.3 G/DL (ref 13–17.7)
IMM GRANULOCYTES # BLD AUTO: 0 X10E3/UL (ref 0–0.1)
IMM GRANULOCYTES NFR BLD AUTO: 0 %
LDLC SERPL CALC-MCNC: 120 MG/DL (ref 0–99)
LYMPHOCYTES # BLD AUTO: 1.6 X10E3/UL (ref 0.7–3.1)
LYMPHOCYTES NFR BLD AUTO: 28 %
MCH RBC QN AUTO: 31.2 PG (ref 26.6–33)
MCHC RBC AUTO-ENTMCNC: 33.7 G/DL (ref 31.5–35.7)
MCV RBC AUTO: 93 FL (ref 79–97)
MONOCYTES # BLD AUTO: 0.7 X10E3/UL (ref 0.1–0.9)
MONOCYTES NFR BLD AUTO: 12 %
NEUTROPHILS # BLD AUTO: 3.3 X10E3/UL (ref 1.4–7)
NEUTROPHILS NFR BLD AUTO: 57 %
PLATELET # BLD AUTO: 184 X10E3/UL (ref 150–450)
POTASSIUM SERPL-SCNC: 4.7 MMOL/L (ref 3.5–5.2)
PROT SERPL-MCNC: 7 G/DL (ref 6–8.5)
PSA SERPL-MCNC: 1.1 NG/ML (ref 0–4)
RBC # BLD AUTO: 4.9 X10E6/UL (ref 4.14–5.8)
SODIUM SERPL-SCNC: 139 MMOL/L (ref 134–144)
TRIGL SERPL-MCNC: 130 MG/DL (ref 0–149)
VLDLC SERPL CALC-MCNC: 23 MG/DL (ref 5–40)
WBC # BLD AUTO: 5.7 X10E3/UL (ref 3.4–10.8)

## 2022-09-09 ENCOUNTER — TELEPHONE (OUTPATIENT)
Dept: INTERNAL MEDICINE | Facility: CLINIC | Age: 67
End: 2022-09-09

## 2022-09-09 NOTE — TELEPHONE ENCOUNTER
Incoming Refill Request      Medication requested (name and dose): Crestor 40 mg, TRICOR 145 MG tablet, Nexium 40 mg     Pharmacy where request should be sent: [facility]    Additional details provided by patient: Pt wants a call back to discuss medications with Sandra.    Best call back number: [phone]    Does the patient have less than a 3 day supply:  [x] Yes  [] No    Rafita Montez Rep  09/09/22, 08:35 CDT

## 2022-09-14 RX ORDER — ROSUVASTATIN CALCIUM 40 MG/1
40 TABLET, COATED ORAL DAILY
Qty: 90 TABLET | Refills: 3 | Status: SHIPPED | OUTPATIENT
Start: 2022-09-14 | End: 2022-09-15 | Stop reason: SDUPTHER

## 2022-09-14 RX ORDER — ESOMEPRAZOLE MAGNESIUM 40 MG/1
CAPSULE, DELAYED RELEASE ORAL
Qty: 90 CAPSULE | Refills: 3 | Status: SHIPPED | OUTPATIENT
Start: 2022-09-14 | End: 2022-09-15 | Stop reason: SDUPTHER

## 2022-09-14 RX ORDER — FENOFIBRATE 145 MG/1
145 TABLET, COATED ORAL DAILY
Qty: 90 TABLET | Refills: 3 | Status: SHIPPED | OUTPATIENT
Start: 2022-09-14 | End: 2022-09-15 | Stop reason: SDUPTHER

## 2022-09-14 NOTE — TELEPHONE ENCOUNTER
Rx Refill Note  Requested Prescriptions     Pending Prescriptions Disp Refills   • rosuvastatin (CRESTOR) 40 MG tablet 90 tablet 3     Sig: Take 1 tablet by mouth Daily.   • fenofibrate (TRICOR) 145 MG tablet 90 tablet 3     Sig: Take 1 tablet by mouth Daily.   • esomeprazole (nexIUM) 40 MG capsule 90 capsule 3     Sig: Take 1 capsule by mouth.      Last office visit with prescribing clinician: 8/23/22      Next office visit with prescribing clinician: 11/12/22            Calista Fernandes RN  09/14/22, 11:11 CDT

## 2022-09-15 RX ORDER — ROSUVASTATIN CALCIUM 40 MG/1
40 TABLET, COATED ORAL DAILY
Qty: 90 TABLET | Refills: 3 | Status: SHIPPED | OUTPATIENT
Start: 2022-09-15 | End: 2022-09-16 | Stop reason: SDUPTHER

## 2022-09-15 RX ORDER — ESOMEPRAZOLE MAGNESIUM 40 MG/1
CAPSULE, DELAYED RELEASE ORAL
Qty: 90 CAPSULE | Refills: 3 | Status: SHIPPED | OUTPATIENT
Start: 2022-09-15 | End: 2022-09-16 | Stop reason: SDUPTHER

## 2022-09-15 RX ORDER — FENOFIBRATE 145 MG/1
145 TABLET, COATED ORAL DAILY
Qty: 90 TABLET | Refills: 3 | Status: SHIPPED | OUTPATIENT
Start: 2022-09-15 | End: 2022-09-16 | Stop reason: SDUPTHER

## 2022-09-16 RX ORDER — ESOMEPRAZOLE MAGNESIUM 40 MG/1
CAPSULE, DELAYED RELEASE ORAL
Qty: 90 CAPSULE | Refills: 3 | Status: SHIPPED | OUTPATIENT
Start: 2022-09-16

## 2022-09-16 RX ORDER — FENOFIBRATE 145 MG/1
145 TABLET, COATED ORAL DAILY
Qty: 90 TABLET | Refills: 3 | Status: SHIPPED | OUTPATIENT
Start: 2022-09-16

## 2022-09-16 RX ORDER — ROSUVASTATIN CALCIUM 40 MG/1
40 TABLET, COATED ORAL DAILY
Qty: 90 TABLET | Refills: 3 | Status: SHIPPED | OUTPATIENT
Start: 2022-09-16

## 2022-09-16 NOTE — TELEPHONE ENCOUNTER
Pt called stating that his medications were called into [facility] but they should have been sent to [facility].

## 2022-09-16 NOTE — TELEPHONE ENCOUNTER
[facility] is not on his pharmacy list. When I talked to him last night, he asked me to resend them to [facility]. I will have to get his [facility] Information.

## 2022-09-26 NOTE — TELEPHONE ENCOUNTER
Rx Refill Note  Requested Prescriptions     Pending Prescriptions Disp Refills   • sildenafil (Viagra) 50 MG tablet 30 tablet 1     Sig: Take 1 tablet by mouth Daily As Needed for Erectile Dysfunction.      Last office visit with prescribing clinician: 8/23/2022      Next office visit with prescribing clinician: 11/17/2022            Calista Fernandes RN  09/26/22, 07:49 CDT

## 2022-09-27 RX ORDER — SILDENAFIL 50 MG/1
50 TABLET, FILM COATED ORAL DAILY PRN
Qty: 30 TABLET | Refills: 1 | Status: SHIPPED | OUTPATIENT
Start: 2022-09-27

## 2022-11-17 ENCOUNTER — OFFICE VISIT (OUTPATIENT)
Dept: INTERNAL MEDICINE | Facility: CLINIC | Age: 67
End: 2022-11-17

## 2022-11-17 VITALS
BODY MASS INDEX: 29.8 KG/M2 | TEMPERATURE: 98.3 F | HEIGHT: 72 IN | RESPIRATION RATE: 16 BRPM | OXYGEN SATURATION: 96 % | DIASTOLIC BLOOD PRESSURE: 84 MMHG | WEIGHT: 220 LBS | SYSTOLIC BLOOD PRESSURE: 146 MMHG | HEART RATE: 82 BPM

## 2022-11-17 DIAGNOSIS — E11.9 TYPE 2 DIABETES MELLITUS WITHOUT COMPLICATION, WITHOUT LONG-TERM CURRENT USE OF INSULIN: Primary | ICD-10-CM

## 2022-11-17 DIAGNOSIS — B07.9 WART OF HAND: ICD-10-CM

## 2022-11-17 DIAGNOSIS — Z23 FLU VACCINE NEED: ICD-10-CM

## 2022-11-17 DIAGNOSIS — Z23 ENCOUNTER FOR ADMINISTRATION OF VACCINE: ICD-10-CM

## 2022-11-17 LAB
EXPIRATION DATE: ABNORMAL
HBA1C MFR BLD: 6.4 %
Lab: ABNORMAL

## 2022-11-17 PROCEDURE — G0008 ADMIN INFLUENZA VIRUS VAC: HCPCS | Performed by: NURSE PRACTITIONER

## 2022-11-17 PROCEDURE — 90686 IIV4 VACC NO PRSV 0.5 ML IM: CPT | Performed by: NURSE PRACTITIONER

## 2022-11-17 PROCEDURE — 3044F HG A1C LEVEL LT 7.0%: CPT | Performed by: NURSE PRACTITIONER

## 2022-11-17 PROCEDURE — 99214 OFFICE O/P EST MOD 30 MIN: CPT | Performed by: NURSE PRACTITIONER

## 2022-11-17 PROCEDURE — 83036 HEMOGLOBIN GLYCOSYLATED A1C: CPT | Performed by: NURSE PRACTITIONER

## 2022-11-17 PROCEDURE — 90677 PCV20 VACCINE IM: CPT | Performed by: NURSE PRACTITIONER

## 2022-11-17 PROCEDURE — G0009 ADMIN PNEUMOCOCCAL VACCINE: HCPCS | Performed by: NURSE PRACTITIONER

## 2022-11-17 NOTE — PROGRESS NOTES
Subjective     Chief Complaint   Patient presents with   • Diabetes       History of Present Illness  ***  Patient's PMR from outside medical facility reviewed and noted.    Review of Systems   ***  Otherwise complete ROS reviewed and negative except as mentioned in the HPI.    Past Medical History:   Past Medical History:   Diagnosis Date   • Hyperlipidemia    • Hypertension    • Type 2 diabetes mellitus (HCC)      Past Surgical History:  Past Surgical History:   Procedure Laterality Date   • VASECTOMY       Social History:  reports that he has been smoking cigarettes. He has a 75.00 pack-year smoking history. He has never used smokeless tobacco. He reports current alcohol use. He reports that he does not use drugs.    Family History: family history includes Cancer in his mother; Hypertension in his mother.   ***    Allergies:  Allergies   Allergen Reactions   • Lisinopril Cough     Medications:  Prior to Admission medications    Medication Sig Start Date End Date Taking? Authorizing Provider   Blood Glucose Monitoring Suppl (ONE TOUCH ULTRA 2) w/Device kit 1 each Daily. To test daily. 7/19/22   Melinda Olivares APRN   esomeprazole (nexIUM) 40 MG capsule TAKE 1 CAPSULE EVERY MORNING BEFORE BREAKFAST 9/16/22   Melinda Olivares APRN   Farxiga 5 MG tablet tablet TAKE 1 TABLET DAILY 6/1/22   Melinda Olivares APRN   fenofibrate (TRICOR) 145 MG tablet Take 1 tablet by mouth Daily. 9/16/22   Melinda Olivares APRN   glucose blood test strip Test blood glucose daily. 7/19/22   Melinda Olivares APRN   losartan-hydrochlorothiazide (HYZAAR) 100-25 MG per tablet Take 1 tablet by mouth Daily. 3/8/22   Melinda Olivares APRN   metFORMIN (GLUCOPHAGE) 1000 MG tablet Take 1 tablet by mouth 2 (Two) Times a Day With Meals. 3/8/22   Melinda Olivares APRN   OneTouch Delica Lancets 33G misc 1 each Daily. 7/19/22   Melinda Olivares APRN   rosuvastatin (CRESTOR) 40 MG  "tablet Take 1 tablet by mouth Daily. 9/16/22   Melinda Olivares APRN   sildenafil (Viagra) 50 MG tablet Take 1 tablet by mouth Daily As Needed for Erectile Dysfunction. 9/27/22   Melinda Olivares APRN       Objective     Vital Signs: Pulse 82   Temp 98.3 °F (36.8 °C)   Resp 16   Ht 182.9 cm (72\")   Wt 99.8 kg (220 lb)   SpO2 96%   BMI 29.84 kg/m²   Physical Exam  ***  {BMI is >= 25 and <30. (Overweight) The following options were offered after discussion;:[id number]}      Results Reviewed:  Glucose   Date Value Ref Range Status   08/23/2022 104 (H) 65 - 99 mg/dL Final     BUN   Date Value Ref Range Status   08/23/2022 12 8 - 27 mg/dL Final     Creatinine   Date Value Ref Range Status   08/23/2022 0.88 0.76 - 1.27 mg/dL Final     Sodium   Date Value Ref Range Status   08/23/2022 139 134 - 144 mmol/L Final     Potassium   Date Value Ref Range Status   08/23/2022 4.7 3.5 - 5.2 mmol/L Final     Chloride   Date Value Ref Range Status   08/23/2022 100 96 - 106 mmol/L Final     Total CO2   Date Value Ref Range Status   08/23/2022 24 20 - 29 mmol/L Final     Calcium   Date Value Ref Range Status   08/23/2022 9.5 8.6 - 10.2 mg/dL Final     ALT (SGPT)   Date Value Ref Range Status   08/23/2022 24 0 - 44 IU/L Final     AST (SGOT)   Date Value Ref Range Status   08/23/2022 24 0 - 40 IU/L Final     WBC   Date Value Ref Range Status   08/23/2022 5.7 3.4 - 10.8 x10E3/uL Final     Hematocrit   Date Value Ref Range Status   08/23/2022 45.4 37.5 - 51.0 % Final     Platelets   Date Value Ref Range Status   08/23/2022 184 150 - 450 x10E3/uL Final     Triglycerides   Date Value Ref Range Status   08/23/2022 130 0 - 149 mg/dL Final     HDL Cholesterol   Date Value Ref Range Status   08/23/2022 54 >39 mg/dL Final     LDL Chol Calc (NIH)   Date Value Ref Range Status   08/23/2022 120 (H) 0 - 99 mg/dL Final     Hemoglobin A1C   Date Value Ref Range Status   08/23/2022 6.4 (H) 4.8 - 5.6 % Final     Comment:              " Prediabetes: 5.7 - 6.4           Diabetes: >6.4           Glycemic control for adults with diabetes: <7.0     05/24/2022 5.9 % Final         Assessment / Plan     Assessment/Plan:  There are no diagnoses linked to this encounter.      {Time Spent (Optional):87735}    No follow-ups on file. unless patient needs to be seen sooner or acute issues arise.    Code Status: <no information> ***    I have discussed the patient results/orders and and plan/recommendation with them at today's visit.      Melinda Olivares, APRN   11/17/2022

## 2022-11-17 NOTE — PROGRESS NOTES
Subjective     Chief Complaint   Patient presents with   • Diabetes       History of Present Illness  The patient presents for a diabetes follow-up.     Mr. Bright reports that his blood sugars have been good. His blood sugar was 96 mg/dL this morning. His diet has been mostly good. His last A1c was 6.4 percent. Denies chest pain and shortness of breath. He is not taking metformin. He takes losartan, aspirin, and Farxiga, every other day.    The patient has a wart on his right digitus medicinalis.     His cholesterol level is not bad. The patient is taking losartan and Crestor.     Mr. Bright confirms taking Nexium and Viagra when needed. He drinks beer.     Patient's PMR from outside medical facility reviewed and noted.    Review of Systems   Constitutional: Negative for activity change, appetite change, chills and fever.   HENT: Negative for hearing loss, nosebleeds, tinnitus and trouble swallowing.    Eyes: Negative for visual disturbance.   Respiratory: Negative for cough, chest tightness, shortness of breath and wheezing.    Cardiovascular: Negative for chest pain, palpitations and leg swelling.   Gastrointestinal: Negative for abdominal distention, abdominal pain, blood in stool, constipation, diarrhea, nausea and vomiting.   Endocrine: Negative for cold intolerance, heat intolerance, polydipsia, polyphagia and polyuria.   Genitourinary: Negative for decreased urine volume, difficulty urinating, dysuria, flank pain, frequency and hematuria.   Musculoskeletal: Negative for arthralgias, joint swelling and myalgias.   Skin: Negative for rash.   Allergic/Immunologic: Negative for immunocompromised state.   Neurological: Negative for dizziness, syncope, weakness, light-headedness and headaches.   Hematological: Negative for adenopathy. Does not bruise/bleed easily.   Psychiatric/Behavioral: Negative for confusion and sleep disturbance. The patient is not nervous/anxious.         Otherwise complete ROS  reviewed and negative except as mentioned in the HPI.    Past Medical History:   Past Medical History:   Diagnosis Date   • Hyperlipidemia    • Hypertension    • Type 2 diabetes mellitus (HCC)      Past Surgical History:  Past Surgical History:   Procedure Laterality Date   • VASECTOMY       Social History:  reports that he has been smoking cigarettes. He has a 75.00 pack-year smoking history. He has never used smokeless tobacco. He reports current alcohol use. He reports that he does not use drugs.    Family History: family history includes Cancer in his mother; Hypertension in his mother.      Allergies:  Allergies   Allergen Reactions   • Lisinopril Cough     Medications:  Prior to Admission medications    Medication Sig Start Date End Date Taking? Authorizing Provider   Blood Glucose Monitoring Suppl (ONE TOUCH ULTRA 2) w/Device kit 1 each Daily. To test daily. 7/19/22   Melinda Olivares APRN   esomeprazole (nexIUM) 40 MG capsule TAKE 1 CAPSULE EVERY MORNING BEFORE BREAKFAST 9/16/22   Melinda Olivares APRN   Farxiga 5 MG tablet tablet TAKE 1 TABLET DAILY 6/1/22   Melinda Olivares APRN   fenofibrate (TRICOR) 145 MG tablet Take 1 tablet by mouth Daily. 9/16/22   Melinda Olivares APRN   glucose blood test strip Test blood glucose daily. 7/19/22   Melinda Olivares APRN   losartan-hydrochlorothiazide (HYZAAR) 100-25 MG per tablet Take 1 tablet by mouth Daily. 3/8/22   Melinda Olivares APRN   metFORMIN (GLUCOPHAGE) 1000 MG tablet Take 1 tablet by mouth 2 (Two) Times a Day With Meals. 3/8/22   Melinda Olivares APRN   OneTouch Delica Lancets 33G misc 1 each Daily. 7/19/22   Melinda Olivares APRN   rosuvastatin (CRESTOR) 40 MG tablet Take 1 tablet by mouth Daily. 9/16/22   Melinda Olivares APRN   sildenafil (Viagra) 50 MG tablet Take 1 tablet by mouth Daily As Needed for Erectile Dysfunction. 9/27/22   Melinda Olivares APRN       PHQ-9  "Depression Screening  Little interest or pleasure in doing things? 0-->not at all   Feeling down, depressed, or hopeless? 0-->not at all   Trouble falling or staying asleep, or sleeping too much?     Feeling tired or having little energy?     Poor appetite or overeating?     Feeling bad about yourself - or that you are a failure or have let yourself or your family down?     Trouble concentrating on things, such as reading the newspaper or watching television?     Moving or speaking so slowly that other people could have noticed? Or the opposite - being so fidgety or restless that you have been moving around a lot more than usual?     Thoughts that you would be better off dead, or of hurting yourself in some way?     PHQ-9 Total Score 0   If you checked off any problems, how difficult have these problems made it for you to do your work, take care of things at home, or get along with other people?                Objective     Vital Signs: /84   Pulse 82   Temp 98.3 °F (36.8 °C)   Resp 16   Ht 182.9 cm (72\")   Wt 99.8 kg (220 lb)   SpO2 96%   BMI 29.84 kg/m²   Physical Exam  Constitutional:       Appearance: He is well-developed.   HENT:      Head: Normocephalic and atraumatic.   Eyes:      Conjunctiva/sclera: Conjunctivae normal.      Pupils: Pupils are equal, round, and reactive to light.   Neck:      Vascular: No JVD.   Cardiovascular:      Rate and Rhythm: Normal rate and regular rhythm.      Heart sounds: Normal heart sounds. No murmur heard.    No friction rub. No gallop.   Pulmonary:      Effort: Pulmonary effort is normal. No respiratory distress.      Breath sounds: Normal breath sounds. No wheezing or rales.   Chest:      Chest wall: No tenderness.   Abdominal:      General: Bowel sounds are normal. There is no distension.      Palpations: Abdomen is soft.      Tenderness: There is no abdominal tenderness. There is no guarding or rebound.   Musculoskeletal:         General: No tenderness or " deformity. Normal range of motion.      Cervical back: Neck supple.   Skin:     General: Skin is warm and dry.      Findings: No rash.      Comments: Wart left ring finger. Large. Septated.    Neurological:      Mental Status: He is alert and oriented to person, place, and time.      Cranial Nerves: No cranial nerve deficit.      Motor: No abnormal muscle tone.      Deep Tendon Reflexes: Reflexes normal.   Psychiatric:         Behavior: Behavior normal.         Thought Content: Thought content normal.         Judgment: Judgment normal.         BMI is >= 25 and <30. (Overweight) The following options were offered after discussion;: exercise counseling/recommendations and nutrition counseling/recommendations      Results Reviewed:  Glucose   Date Value Ref Range Status   08/23/2022 104 (H) 65 - 99 mg/dL Final     BUN   Date Value Ref Range Status   08/23/2022 12 8 - 27 mg/dL Final     Creatinine   Date Value Ref Range Status   08/23/2022 0.88 0.76 - 1.27 mg/dL Final     Sodium   Date Value Ref Range Status   08/23/2022 139 134 - 144 mmol/L Final     Potassium   Date Value Ref Range Status   08/23/2022 4.7 3.5 - 5.2 mmol/L Final     Chloride   Date Value Ref Range Status   08/23/2022 100 96 - 106 mmol/L Final     Total CO2   Date Value Ref Range Status   08/23/2022 24 20 - 29 mmol/L Final     Calcium   Date Value Ref Range Status   08/23/2022 9.5 8.6 - 10.2 mg/dL Final     ALT (SGPT)   Date Value Ref Range Status   08/23/2022 24 0 - 44 IU/L Final     AST (SGOT)   Date Value Ref Range Status   08/23/2022 24 0 - 40 IU/L Final     WBC   Date Value Ref Range Status   08/23/2022 5.7 3.4 - 10.8 x10E3/uL Final     Hematocrit   Date Value Ref Range Status   08/23/2022 45.4 37.5 - 51.0 % Final     Platelets   Date Value Ref Range Status   08/23/2022 184 150 - 450 x10E3/uL Final     Triglycerides   Date Value Ref Range Status   08/23/2022 130 0 - 149 mg/dL Final     HDL Cholesterol   Date Value Ref Range Status   08/23/2022 54  >39 mg/dL Final     LDL Chol Calc (NIH)   Date Value Ref Range Status   08/23/2022 120 (H) 0 - 99 mg/dL Final     Hemoglobin A1C   Date Value Ref Range Status   11/17/2022 6.4 % Final         Assessment / Plan     Assessment/Plan:  Diagnoses and all orders for this visit:    1. Type 2 diabetes mellitus without complication, without long-term current use of insulin (HCC) (Primary)  -     POC Glycosylated Hemoglobin (Hb A1C)    2. Flu vaccine need  -     FluLaval/Fluzone >6 mos (5426-4556)    3. Encounter for administration of vaccine  -     FluLaval/Fluzone >6 mos (5438-2960)  -     Pneumococcal Conjugate Vaccine 20-Valent (PCV20)    4. Wart of hand  -     Ambulatory Referral to Plastic Surgery      Return in about 3 months (around 2/17/2023). unless patient needs to be seen sooner or acute issues arise.      I have discussed the patient results/orders and and plan/recommendation with them at today's visit.      Transcribed from ambient dictation for TAI Payne by Lina Sharma.  11/17/22   11:20 CST    Patient or patient representative verbalized consent to the visit recording.  I have personally performed the services described in this document as transcribed by the above individual, and it is both accurate and complete.  TAI Payne  11/17/2022  12:52 CST        Answers for HPI/ROS submitted by the patient on 11/10/2022  What is the primary reason for your visit?: Physical

## 2022-11-29 DIAGNOSIS — B07.9 WART OF HAND: Primary | ICD-10-CM

## 2023-08-29 ENCOUNTER — OFFICE VISIT (OUTPATIENT)
Dept: INTERNAL MEDICINE | Facility: CLINIC | Age: 68
End: 2023-08-29
Payer: MEDICARE

## 2023-08-29 VITALS
WEIGHT: 220 LBS | SYSTOLIC BLOOD PRESSURE: 151 MMHG | TEMPERATURE: 98.4 F | HEIGHT: 72 IN | HEART RATE: 69 BPM | RESPIRATION RATE: 18 BRPM | OXYGEN SATURATION: 97 % | BODY MASS INDEX: 29.8 KG/M2 | DIASTOLIC BLOOD PRESSURE: 93 MMHG

## 2023-08-29 DIAGNOSIS — E78.49 OTHER HYPERLIPIDEMIA: ICD-10-CM

## 2023-08-29 DIAGNOSIS — Z00.00 MEDICARE ANNUAL WELLNESS VISIT, SUBSEQUENT: Primary | ICD-10-CM

## 2023-08-29 DIAGNOSIS — K21.9 GASTROESOPHAGEAL REFLUX DISEASE WITHOUT ESOPHAGITIS: ICD-10-CM

## 2023-08-29 DIAGNOSIS — E11.9 ENCOUNTER FOR DIABETIC FOOT EXAM: ICD-10-CM

## 2023-08-29 DIAGNOSIS — Z12.2 ENCOUNTER FOR SCREENING FOR LUNG CANCER: ICD-10-CM

## 2023-08-29 DIAGNOSIS — Z13.6 ENCOUNTER FOR ABDOMINAL AORTIC ANEURYSM (AAA) SCREENING: ICD-10-CM

## 2023-08-29 DIAGNOSIS — Z87.891 PERSONAL HISTORY OF NICOTINE DEPENDENCE: ICD-10-CM

## 2023-08-29 DIAGNOSIS — I10 PRIMARY HYPERTENSION: ICD-10-CM

## 2023-08-29 DIAGNOSIS — Z12.5 SCREENING FOR PROSTATE CANCER: ICD-10-CM

## 2023-08-29 DIAGNOSIS — E11.9 TYPE 2 DIABETES MELLITUS WITHOUT COMPLICATION, WITHOUT LONG-TERM CURRENT USE OF INSULIN: ICD-10-CM

## 2023-08-29 LAB
EXPIRATION DATE: ABNORMAL
HBA1C MFR BLD: 6.7 %
Lab: ABNORMAL

## 2023-08-29 RX ORDER — LOSARTAN POTASSIUM AND HYDROCHLOROTHIAZIDE 12.5; 5 MG/1; MG/1
1 TABLET ORAL DAILY
Qty: 90 TABLET | Refills: 1 | Status: SHIPPED | OUTPATIENT
Start: 2023-08-29

## 2023-08-29 RX ORDER — FENOFIBRATE 145 MG/1
145 TABLET, COATED ORAL DAILY
Qty: 90 TABLET | Refills: 3 | Status: SHIPPED | OUTPATIENT
Start: 2023-08-29

## 2023-08-29 RX ORDER — ESOMEPRAZOLE MAGNESIUM 40 MG/1
CAPSULE, DELAYED RELEASE ORAL
Qty: 90 CAPSULE | Refills: 3 | Status: SHIPPED | OUTPATIENT
Start: 2023-08-29

## 2023-08-29 RX ORDER — ROSUVASTATIN CALCIUM 40 MG/1
40 TABLET, COATED ORAL DAILY
Qty: 90 TABLET | Refills: 3 | Status: SHIPPED | OUTPATIENT
Start: 2023-08-29

## 2023-08-29 NOTE — PROGRESS NOTES
The ABCs of the Annual Wellness Visit  Subsequent Medicare Wellness Visit    Subjective    Sree Bright is a 68 y.o. male who presents for a Subsequent Medicare Wellness Visit.    The following portions of the patient's history were reviewed and   updated as appropriate: allergies, current medications, past family history, past medical history, past social history, past surgical history, and problem list.    Compared to one year ago, the patient feels his physical   health is better.    Compared to one year ago, the patient feels his mental   health is the same.    Recent Hospitalizations:  He was not admitted to the hospital during the last year.       Current Medical Providers:  Patient Care Team:  Melinda Olivares APRN as PCP - General (Nurse Practitioner)  Hyacinth Murillo RN as Ambulatory  (Population Health)    Outpatient Medications Prior to Visit   Medication Sig Dispense Refill    Blood Glucose Monitoring Suppl (ONE TOUCH ULTRA 2) w/Device kit 1 each Daily. To test daily. 1 each 0    glucose blood test strip Test blood glucose daily. 100 each 12    OneTouch Delica Lancets 33G misc 1 each Daily. 100 each 1    sildenafil (VIAGRA) 50 MG tablet TAKE 1 TABLET ONE TIME DAILY AS NEEDED FOR ERECTILE DYSFUNCTION 18 tablet 1    esomeprazole (nexIUM) 40 MG capsule TAKE 1 CAPSULE EVERY MORNING BEFORE BREAKFAST 90 capsule 3    Farxiga 5 MG tablet tablet TAKE 1 TABLET DAILY 30 tablet 11    fenofibrate (TRICOR) 145 MG tablet Take 1 tablet by mouth Daily. 90 tablet 3    losartan-hydrochlorothiazide (HYZAAR) 100-25 MG per tablet Take 1 tablet by mouth Daily. 30 tablet 2    metFORMIN (GLUCOPHAGE) 1000 MG tablet Take 1 tablet by mouth 2 (Two) Times a Day With Meals. 60 tablet 1    rosuvastatin (CRESTOR) 40 MG tablet Take 1 tablet by mouth Daily. 90 tablet 3     No facility-administered medications prior to visit.       No opioid medication identified on active medication list. I have reviewed chart  "for other potential  high risk medication/s and harmful drug interactions in the elderly.        Aspirin is not on active medication list.  Aspirin use is indicated based on review of current medical condition/s. Pros and cons of this therapy have been discussed with this patient. Benefits of this medication outweigh potential harm.  Patient has been instructed to start taking this medication..    Patient Active Problem List   Diagnosis    Cough    Diabetes mellitus    Gastroesophageal reflux disease    Hyperlipidemia    Hypertensive disorder    Upper respiratory infection     Advance Care Planning   Advance Care Planning     Advance Directive is not on file.  ACP discussion was held with the patient during this visit. Patient has an advance directive (not in EMR), copy requested.     Objective    Vitals:    23 0805   BP: 151/93   Pulse: 69   Resp: 18   Temp: 98.4 øF (36.9 øC)   SpO2: 97%   Weight: 99.8 kg (220 lb)   Height: 182.9 cm (72\")     Estimated body mass index is 29.84 kg/mý as calculated from the following:    Height as of this encounter: 182.9 cm (72\").    Weight as of this encounter: 99.8 kg (220 lb).    BMI is >= 25 and <30. (Overweight) The following options were offered after discussion;: exercise counseling/recommendations and nutrition counseling/recommendations      Does the patient have evidence of cognitive impairment? No    Lab Results   Component Value Date    HGBA1C 6.7 2023        HEALTH RISK ASSESSMENT    Smoking Status:  Social History     Tobacco Use   Smoking Status Every Day    Packs/day: 1.50    Years: 50.00    Pack years: 75.00    Types: Cigarettes   Smokeless Tobacco Never     Alcohol Consumption:  Social History     Substance and Sexual Activity   Alcohol Use Yes     Fall Risk Screen:    STEADI Fall Risk Assessment was completed, and patient is at LOW risk for falls.Assessment completed on:2023    Depression Screenin/29/2023     8:01 AM   PHQ-2/PHQ-9 " Depression Screening   Little Interest or Pleasure in Doing Things 0-->not at all   Feeling Down, Depressed or Hopeless 0-->not at all   PHQ-9: Brief Depression Severity Measure Score 0       Health Habits and Functional and Cognitive Screenin/29/2023     8:01 AM   Functional & Cognitive Status   Do you have difficulty preparing food and eating? No   Do you have difficulty bathing yourself, getting dressed or grooming yourself? No   Do you have difficulty using the toilet? No   Do you have difficulty moving around from place to place? No   Current Diet Other   Dental Exam Not up to date   Eye Exam Up to date   Exercise (times per week) Other   Current Exercises Include Yard Work;Walking   Do you need help using the phone?  No   Are you deaf or do you have serious difficulty hearing?  No   Do you need help to go to places out of walking distance? No   Do you need help shopping? No   Do you need help preparing meals?  No   Do you need help with housework?  No   Do you need help with laundry? No   Do you need help taking your medications? No   Do you need help managing money? No   Do you ever drive or ride in a car without wearing a seat belt? No   Have you felt unusual stress, anger or loneliness in the last month? No   Who do you live with? Spouse   If you need help, do you have trouble finding someone available to you? No   Have you been bothered in the last four weeks by sexual problems? No   Do you have difficulty concentrating, remembering or making decisions? No       Age-appropriate Screening Schedule:  Refer to the list below for future screening recommendations based on patient's age, sex and/or medical conditions. Orders for these recommended tests are listed in the plan section. The patient has been provided with a written plan.    Health Maintenance   Topic Date Due    LUNG CANCER SCREENING  Never done    AAA SCREEN (ONE-TIME)  Never done    DIABETIC EYE EXAM  2023    URINE MICROALBUMIN   05/24/2023    LIPID PANEL  08/23/2023    ZOSTER VACCINE (1 of 2) 08/29/2023 (Originally 6/10/2007)    TDAP/TD VACCINES (1 - Tdap) 08/29/2024 (Originally 6/10/1976)    INFLUENZA VACCINE  10/01/2023    BMI FOLLOWUP  11/17/2023    HEMOGLOBIN A1C  02/29/2024    COLORECTAL CANCER SCREENING  04/25/2024    ANNUAL WELLNESS VISIT  08/29/2024    DIABETIC FOOT EXAM  08/29/2024    HEPATITIS C SCREENING  Completed    Pneumococcal Vaccine 65+  Completed    COVID-19 Vaccine  Discontinued                  CMS Preventative Services Quick Reference  Risk Factors Identified During Encounter  Alcohol Misuse: Patient encouraged to limit alcohol use to no more than 1 standard alcoholic beverage per day. (12 ounce beer, 6 ounce wine, one shot liquor)  The above risks/problems have been discussed with the patient.  Pertinent information has been shared with the patient in the After Visit Summary.  An After Visit Summary and PPPS were made available to the patient.    Follow Up:   Next Medicare Wellness visit to be scheduled in 1 year.       Diagnoses and all orders for this visit:    1. Medicare annual wellness visit, subsequent (Primary)  -     Comprehensive Metabolic Panel  -     CBC & Differential    2. Type 2 diabetes mellitus without complication, without long-term current use of insulin  -     MicroAlbumin, Urine, Random - Urine, Clean Catch  -     POC Glycosylated Hemoglobin (Hb A1C)  -     metFORMIN (GLUCOPHAGE) 1000 MG tablet; Take 1 tablet by mouth 2 (Two) Times a Day With Meals.  Dispense: 180 tablet; Refill: 1    3. Other hyperlipidemia  -     Lipid Panel  -     rosuvastatin (CRESTOR) 40 MG tablet; Take 1 tablet by mouth Daily.  Dispense: 90 tablet; Refill: 3  -     fenofibrate (TRICOR) 145 MG tablet; Take 1 tablet by mouth Daily.  Dispense: 90 tablet; Refill: 3    4. Encounter for diabetic foot exam    5. Screening for prostate cancer  -     PSA SCREENING    6. Encounter for abdominal aortic aneurysm (AAA) screening  -     [place]  AAA Screen Limited; Future    7. Encounter for screening for lung cancer  -     CT Chest Low Dose Wo; Future    8. Personal history of nicotine dependence  -     CT Chest Low Dose Wo; Future    9. Gastroesophageal reflux disease without esophagitis  -     esomeprazole (nexIUM) 40 MG capsule; TAKE 1 CAPSULE EVERY MORNING BEFORE BREAKFAST  Dispense: 90 capsule; Refill: 3    10. Primary hypertension  -     losartan-hydrochlorothiazide (Hyzaar) 50-12.5 MG per tablet; Take 1 tablet by mouth Daily.  Dispense: 90 tablet; Refill: 1

## 2023-08-30 LAB
ALBUMIN SERPL-MCNC: 5 G/DL (ref 3.9–4.9)
ALBUMIN/GLOB SERPL: 1.9 {RATIO} (ref 1.2–2.2)
ALP SERPL-CCNC: 55 IU/L (ref 44–121)
ALT SERPL-CCNC: 18 IU/L (ref 0–44)
AST SERPL-CCNC: 20 IU/L (ref 0–40)
BASOPHILS # BLD AUTO: 0.1 X10E3/UL (ref 0–0.2)
BASOPHILS NFR BLD AUTO: 1 %
BILIRUB SERPL-MCNC: 0.6 MG/DL (ref 0–1.2)
BUN SERPL-MCNC: 17 MG/DL (ref 8–27)
BUN/CREAT SERPL: 18 (ref 10–24)
CALCIUM SERPL-MCNC: 10.1 MG/DL (ref 8.6–10.2)
CHLORIDE SERPL-SCNC: 99 MMOL/L (ref 96–106)
CHOLEST SERPL-MCNC: 210 MG/DL (ref 100–199)
CO2 SERPL-SCNC: 23 MMOL/L (ref 20–29)
CREAT SERPL-MCNC: 0.93 MG/DL (ref 0.76–1.27)
EGFRCR SERPLBLD CKD-EPI 2021: 91 ML/MIN/1.73
EOSINOPHIL # BLD AUTO: 0.2 X10E3/UL (ref 0–0.4)
EOSINOPHIL NFR BLD AUTO: 3 %
ERYTHROCYTE [DISTWIDTH] IN BLOOD BY AUTOMATED COUNT: 12.8 % (ref 11.6–15.4)
GLOBULIN SER CALC-MCNC: 2.6 G/DL (ref 1.5–4.5)
GLUCOSE SERPL-MCNC: 129 MG/DL (ref 70–99)
HCT VFR BLD AUTO: 48.9 % (ref 37.5–51)
HDLC SERPL-MCNC: 52 MG/DL
HGB BLD-MCNC: 16.6 G/DL (ref 13–17.7)
IMM GRANULOCYTES # BLD AUTO: 0 X10E3/UL (ref 0–0.1)
IMM GRANULOCYTES NFR BLD AUTO: 0 %
LDLC SERPL CALC-MCNC: 129 MG/DL (ref 0–99)
LYMPHOCYTES # BLD AUTO: 1.7 X10E3/UL (ref 0.7–3.1)
LYMPHOCYTES NFR BLD AUTO: 23 %
MCH RBC QN AUTO: 31.6 PG (ref 26.6–33)
MCHC RBC AUTO-ENTMCNC: 33.9 G/DL (ref 31.5–35.7)
MCV RBC AUTO: 93 FL (ref 79–97)
MICROALBUMIN UR-MCNC: 9.4 UG/ML
MONOCYTES # BLD AUTO: 0.8 X10E3/UL (ref 0.1–0.9)
MONOCYTES NFR BLD AUTO: 12 %
NEUTROPHILS # BLD AUTO: 4.4 X10E3/UL (ref 1.4–7)
NEUTROPHILS NFR BLD AUTO: 61 %
PLATELET # BLD AUTO: 205 X10E3/UL (ref 150–450)
POTASSIUM SERPL-SCNC: 4.6 MMOL/L (ref 3.5–5.2)
PROT SERPL-MCNC: 7.6 G/DL (ref 6–8.5)
PSA SERPL-MCNC: 1.3 NG/ML (ref 0–4)
RBC # BLD AUTO: 5.25 X10E6/UL (ref 4.14–5.8)
SODIUM SERPL-SCNC: 139 MMOL/L (ref 134–144)
TRIGL SERPL-MCNC: 164 MG/DL (ref 0–149)
VLDLC SERPL CALC-MCNC: 29 MG/DL (ref 5–40)
WBC # BLD AUTO: 7.2 X10E3/UL (ref 3.4–10.8)

## 2023-09-25 ENCOUNTER — HOSPITAL ENCOUNTER (OUTPATIENT)
Dept: CT IMAGING | Facility: HOSPITAL | Age: 68
Discharge: HOME | End: 2023-09-25
Payer: MEDICARE

## 2023-09-25 ENCOUNTER — HOSPITAL ENCOUNTER (OUTPATIENT)
Dept: ULTRASOUND IMAGING | Facility: HOSPITAL | Age: 68
Discharge: HOME | End: 2023-09-25
Payer: MEDICARE

## 2023-09-25 DIAGNOSIS — Z13.6 ENCOUNTER FOR ABDOMINAL AORTIC ANEURYSM (AAA) SCREENING: ICD-10-CM

## 2023-09-25 DIAGNOSIS — Z87.891 PERSONAL HISTORY OF NICOTINE DEPENDENCE: ICD-10-CM

## 2023-09-25 DIAGNOSIS — Z12.2 ENCOUNTER FOR SCREENING FOR LUNG CANCER: ICD-10-CM

## 2023-09-25 PROCEDURE — 76706 US ABDL AORTA SCREEN AAA: CPT

## 2023-09-25 PROCEDURE — 71271 CT THORAX LUNG CANCER SCR C-: CPT

## 2024-02-29 ENCOUNTER — OFFICE VISIT (OUTPATIENT)
Dept: INTERNAL MEDICINE | Facility: CLINIC | Age: 69
End: 2024-02-29
Payer: MEDICARE

## 2024-02-29 VITALS
OXYGEN SATURATION: 97 % | HEART RATE: 57 BPM | SYSTOLIC BLOOD PRESSURE: 138 MMHG | BODY MASS INDEX: 29.93 KG/M2 | WEIGHT: 221 LBS | HEIGHT: 72 IN | RESPIRATION RATE: 17 BRPM | TEMPERATURE: 98.6 F | DIASTOLIC BLOOD PRESSURE: 79 MMHG

## 2024-02-29 DIAGNOSIS — I10 PRIMARY HYPERTENSION: ICD-10-CM

## 2024-02-29 DIAGNOSIS — E11.9 TYPE 2 DIABETES MELLITUS WITHOUT COMPLICATION, WITHOUT LONG-TERM CURRENT USE OF INSULIN: Primary | ICD-10-CM

## 2024-02-29 DIAGNOSIS — E78.49 OTHER HYPERLIPIDEMIA: ICD-10-CM

## 2024-02-29 LAB
EXPIRATION DATE: ABNORMAL
HBA1C MFR BLD: 6.2 % (ref 4.5–5.7)
Lab: ABNORMAL

## 2024-02-29 RX ORDER — LOSARTAN POTASSIUM AND HYDROCHLOROTHIAZIDE 12.5; 5 MG/1; MG/1
1 TABLET ORAL DAILY
Qty: 90 TABLET | Refills: 1 | Status: SHIPPED | OUTPATIENT
Start: 2024-02-29

## 2024-02-29 NOTE — PROGRESS NOTES
Subjective     Chief Complaint   Patient presents with    Diabetes       History of Present Illness    The patient is a 68-year-old male who presents to the office for follow-up of diabetes.    His sugars are running 120 to 130. He is doing better on his diet. His weight has remained the same as it was 6 months ago. He is taking metformin 1 tablet at 7:30 in the morning.    He is on Crestor 40 mg. He denies any aches or pains. He denies any chest pain or shortness of breath. He is eating, drinking, urinating, and having normal bowel movements.    He has not checked his blood pressure at home in a while. He takes his blood pressure medication every morning.    Review of Systems   Otherwise complete ROS reviewed and negative except as mentioned in the HPI.    Past Medical History:   Past Medical History:   Diagnosis Date    Hyperlipidemia     Hypertension     Type 2 diabetes mellitus      Past Surgical History:  Past Surgical History:   Procedure Laterality Date    VASECTOMY       Social History:  reports that he has been smoking cigarettes. He has a 75.00 pack-year smoking history. He has never used smokeless tobacco. He reports current alcohol use. He reports that he does not use drugs.    Family History: family history includes Cancer in his mother; Hypertension in his mother.      Allergies:  Allergies   Allergen Reactions    Lisinopril Cough     Medications:  Prior to Admission medications    Medication Sig Start Date End Date Taking? Authorizing Provider   losartan-hydrochlorothiazide (Hyzaar) 50-12.5 MG per tablet Take 1 tablet by mouth Daily. 2/29/24  Yes Melinda Olivares APRN   Blood Glucose Monitoring Suppl (ONE TOUCH ULTRA 2) w/Device kit 1 each Daily. To test daily. 7/19/22   Melinda Olivares APRN   esomeprazole (nexIUM) 40 MG capsule TAKE 1 CAPSULE EVERY MORNING BEFORE BREAKFAST 8/29/23   Melinda Olivares APRN   fenofibrate (TRICOR) 145 MG tablet Take 1 tablet by mouth  "Daily. 8/29/23   Melinda Olivares APRN   glucose blood test strip Test blood glucose daily. 7/19/22   Melinda Olivares APRN   metFORMIN (GLUCOPHAGE) 1000 MG tablet Take 1 tablet by mouth 2 (Two) Times a Day With Meals. 8/29/23   Melinda Olivares APRN   OneTouch Delica Lancets 33G misc 1 each Daily. 7/19/22   Melinda Olivares APRN   rosuvastatin (CRESTOR) 40 MG tablet Take 1 tablet by mouth Daily. 8/29/23   Melinda Olivares APRN   sildenafil (VIAGRA) 50 MG tablet TAKE 1 TABLET ONE TIME DAILY AS NEEDED FOR ERECTILE DYSFUNCTION 7/18/23   Melinda Olivares APRN   losartan-hydrochlorothiazide (Hyzaar) 50-12.5 MG per tablet Take 1 tablet by mouth Daily. 8/29/23 2/29/24  Melinda Olivares APRN       PHQ-9 Depression Screening  Little interest or pleasure in doing things? 0-->not at all   Feeling down, depressed, or hopeless? 0-->not at all   Trouble falling or staying asleep, or sleeping too much?     Feeling tired or having little energy?     Poor appetite or overeating?     Feeling bad about yourself - or that you are a failure or have let yourself or your family down?     Trouble concentrating on things, such as reading the newspaper or watching television?     Moving or speaking so slowly that other people could have noticed? Or the opposite - being so fidgety or restless that you have been moving around a lot more than usual?     Thoughts that you would be better off dead, or of hurting yourself in some way?     PHQ-9 Total Score 0   If you checked off any problems, how difficult have these problems made it for you to do your work, take care of things at home, or get along with other people?                Objective     Vital Signs: /79   Pulse 57   Temp 98.6 °F (37 °C)   Resp 17   Ht 182.9 cm (72\")   Wt 100 kg (221 lb)   SpO2 97%   BMI 29.97 kg/m²   Physical Exam  Vitals reviewed.   Constitutional:       Appearance: He is well-developed. He is obese. "   HENT:      Head: Normocephalic and atraumatic.      Right Ear: Tympanic membrane normal.      Left Ear: Tympanic membrane normal.   Eyes:      Pupils: Pupils are equal, round, and reactive to light.   Neck:      Vascular: No JVD.   Cardiovascular:      Rate and Rhythm: Normal rate and regular rhythm.   Pulmonary:      Effort: Pulmonary effort is normal.      Breath sounds: Wheezing (anterior) present.   Abdominal:      General: Bowel sounds are normal.      Palpations: Abdomen is soft.   Musculoskeletal:         General: No deformity.      Cervical back: Normal range of motion and neck supple.   Lymphadenopathy:      Cervical: No cervical adenopathy.   Skin:     General: Skin is warm and dry.   Neurological:      Mental Status: He is alert and oriented to person, place, and time.   Psychiatric:         Behavior: Behavior normal.         Thought Content: Thought content normal.         Judgment: Judgment normal.       Results Reviewed:  Glucose   Date Value Ref Range Status   08/29/2023 129 (H) 70 - 99 mg/dL Final     BUN   Date Value Ref Range Status   08/29/2023 17 8 - 27 mg/dL Final     Creatinine   Date Value Ref Range Status   08/29/2023 0.93 0.76 - 1.27 mg/dL Final     Sodium   Date Value Ref Range Status   08/29/2023 139 134 - 144 mmol/L Final     Potassium   Date Value Ref Range Status   08/29/2023 4.6 3.5 - 5.2 mmol/L Final     Chloride   Date Value Ref Range Status   08/29/2023 99 96 - 106 mmol/L Final     Total CO2   Date Value Ref Range Status   08/29/2023 23 20 - 29 mmol/L Final     Calcium   Date Value Ref Range Status   08/29/2023 10.1 8.6 - 10.2 mg/dL Final     ALT (SGPT)   Date Value Ref Range Status   08/29/2023 18 0 - 44 IU/L Final     AST (SGOT)   Date Value Ref Range Status   08/29/2023 20 0 - 40 IU/L Final     WBC   Date Value Ref Range Status   08/29/2023 7.2 3.4 - 10.8 x10E3/uL Final     Hematocrit   Date Value Ref Range Status   08/29/2023 48.9 37.5 - 51.0 % Final     Platelets   Date Value  Ref Range Status   08/29/2023 205 150 - 450 x10E3/uL Final     Triglycerides   Date Value Ref Range Status   08/29/2023 164 (H) 0 - 149 mg/dL Final     HDL Cholesterol   Date Value Ref Range Status   08/29/2023 52 >39 mg/dL Final     LDL Chol Calc (NIH)   Date Value Ref Range Status   08/29/2023 129 (H) 0 - 99 mg/dL Final     Hemoglobin A1C   Date Value Ref Range Status   02/29/2024 6.2 (A) 4.5 - 5.7 % Final         Assessment / Plan     Assessment/Plan:  1. Type 2 diabetes mellitus without complication, without long-term current use of insulin  - POC Glycosylated Hemoglobin (Hb A1C)  -A1c is 6.2 today, much improved.     2. Other hyperlipidemia  - Lipid Panel  -continue Crestor    3. Primary hypertension  - Comprehensive Metabolic Panel  - losartan-hydrochlorothiazide (Hyzaar) 50-12.5 MG per tablet; Take 1 tablet by mouth Daily.  Dispense: 90 tablet; Refill: 1        Return in about 6 months (around 8/29/2024). unless patient needs to be seen sooner or acute issues arise.      I have discussed the patient results/orders and and plan/recommendation with them at today's visit.      Transcribed from ambient dictation for TAI Payne by Delphine Laird.  02/29/24   08:53 CST    Patient or patient representative verbalized consent to the visit recording.  I have personally performed the services described in this document as transcribed by the above individual, and it is both accurate and complete.  TAI Payne  2/29/2024  10:39 CST         Answers submitted by the patient for this visit:  Primary Reason for Visit (Submitted on 2/22/2024)  What is the primary reason for your visit?: Diabetes  Diabetes Questionnaire (Submitted on 2/22/2024)  Chief Complaint: Diabetes problem  Below 70: never

## 2024-03-01 LAB
ALBUMIN SERPL-MCNC: 4.6 G/DL (ref 3.9–4.9)
ALBUMIN/GLOB SERPL: 1.8 {RATIO} (ref 1.2–2.2)
ALP SERPL-CCNC: 48 IU/L (ref 44–121)
ALT SERPL-CCNC: 17 IU/L (ref 0–44)
AST SERPL-CCNC: 21 IU/L (ref 0–40)
BILIRUB SERPL-MCNC: 0.5 MG/DL (ref 0–1.2)
BUN SERPL-MCNC: 18 MG/DL (ref 8–27)
BUN/CREAT SERPL: 17 (ref 10–24)
CALCIUM SERPL-MCNC: 9.9 MG/DL (ref 8.6–10.2)
CHLORIDE SERPL-SCNC: 99 MMOL/L (ref 96–106)
CHOLEST SERPL-MCNC: 204 MG/DL (ref 100–199)
CO2 SERPL-SCNC: 22 MMOL/L (ref 20–29)
CREAT SERPL-MCNC: 1.07 MG/DL (ref 0.76–1.27)
EGFRCR SERPLBLD CKD-EPI 2021: 77 ML/MIN/1.73
GLOBULIN SER CALC-MCNC: 2.5 G/DL (ref 1.5–4.5)
GLUCOSE SERPL-MCNC: 129 MG/DL (ref 70–99)
HDLC SERPL-MCNC: 54 MG/DL
LDLC SERPL CALC-MCNC: 128 MG/DL (ref 0–99)
Lab: NORMAL
POTASSIUM SERPL-SCNC: 4.9 MMOL/L (ref 3.5–5.2)
PROT SERPL-MCNC: 7.1 G/DL (ref 6–8.5)
SODIUM SERPL-SCNC: 138 MMOL/L (ref 134–144)
TRIGL SERPL-MCNC: 125 MG/DL (ref 0–149)
VLDLC SERPL CALC-MCNC: 22 MG/DL (ref 5–40)

## 2024-04-01 ENCOUNTER — TELEPHONE (OUTPATIENT)
Dept: INTERNAL MEDICINE | Facility: CLINIC | Age: 69
End: 2024-04-01
Payer: MEDICARE

## 2024-04-01 NOTE — TELEPHONE ENCOUNTER
Pt called wanting staples removed from his head.  Pt went Friday night to [facility] had a small accident.  I told pt I felt that was soon for removal and he said he didn't know when he was suppose to have taken out.  I told pt someone would call him and discuss further.  Pt voiced understanding.

## 2024-04-05 ENCOUNTER — OFFICE VISIT (OUTPATIENT)
Dept: INTERNAL MEDICINE | Facility: CLINIC | Age: 69
End: 2024-04-05
Payer: MEDICARE

## 2024-04-05 VITALS
OXYGEN SATURATION: 98 % | SYSTOLIC BLOOD PRESSURE: 164 MMHG | RESPIRATION RATE: 17 BRPM | BODY MASS INDEX: 29.8 KG/M2 | HEIGHT: 72 IN | WEIGHT: 220 LBS | TEMPERATURE: 100.4 F | DIASTOLIC BLOOD PRESSURE: 84 MMHG | HEART RATE: 80 BPM

## 2024-04-05 DIAGNOSIS — K04.7 DENTAL ABSCESS: ICD-10-CM

## 2024-04-05 DIAGNOSIS — Z48.02 ENCOUNTER FOR STAPLE REMOVAL: Primary | ICD-10-CM

## 2024-04-05 RX ORDER — CEPHALEXIN 500 MG/1
CAPSULE ORAL
COMMUNITY
Start: 2024-03-30

## 2024-04-05 RX ORDER — CLINDAMYCIN HYDROCHLORIDE 300 MG/1
300 CAPSULE ORAL 3 TIMES DAILY
Qty: 21 CAPSULE | Refills: 0 | Status: SHIPPED | OUTPATIENT
Start: 2024-04-05

## 2024-04-05 RX ORDER — CLINDAMYCIN HYDROCHLORIDE 300 MG/1
300 CAPSULE ORAL 3 TIMES DAILY
Qty: 21 CAPSULE | Refills: 0 | Status: SHIPPED | OUTPATIENT
Start: 2024-04-05 | End: 2024-04-05
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
## 2024-07-19 RX ORDER — SILDENAFIL 50 MG/1
TABLET, FILM COATED ORAL
Qty: 18 TABLET | Refills: 3 | Status: SHIPPED | OUTPATIENT
Start: 2024-07-19

## 2024-07-19 NOTE — TELEPHONE ENCOUNTER
Rx Refill Note  Requested Prescriptions     Pending Prescriptions Disp Refills    sildenafil (VIAGRA) 50 MG tablet [Pharmacy Med Name: SILDENAFIL CITRATE 50 MG Tablet] 18 tablet 3     Sig: TAKE 1 TABLET ONE TIME DAILY AS NEEDED FOR ERECTILE DYSFUNCTION      Last office visit with prescribing clinician: 4/5/2024   Last telemedicine visit with prescribing clinician: Visit date not found   Next office visit with prescribing clinician: 8/30/2024                         Would you like a call back once the refill request has been completed: [] Yes [] No    If the office needs to give you a call back, can they leave a voicemail: [] Yes [] No    Lynsey Ivey MA  07/19/24, 15:20 CDT

## 2024-08-16 ENCOUNTER — PATIENT OUTREACH (OUTPATIENT)
Dept: CASE MANAGEMENT | Facility: OTHER | Age: 69
End: 2024-08-16
Payer: MEDICARE

## 2024-08-16 NOTE — OUTREACH NOTE
[facility] Hypertension Care Compan[facility] Enrollment    Was the enrollment attempt to reach the patient successful?: yes  Date/Time of successful contact: 8/16/2024  8:07 AM  Patient response: not interested     Patient responded on [employer], that he is not interested in this [facility] Blood Pressure Program.     Hyacinth KEBEDE  Ambulatory Case Management    8/16/2024, 08:08 CDT

## 2024-08-30 ENCOUNTER — OFFICE VISIT (OUTPATIENT)
Dept: INTERNAL MEDICINE | Facility: CLINIC | Age: 69
End: 2024-08-30
Payer: MEDICARE

## 2024-08-30 VITALS
WEIGHT: 220 LBS | SYSTOLIC BLOOD PRESSURE: 144 MMHG | HEART RATE: 76 BPM | OXYGEN SATURATION: 98 % | BODY MASS INDEX: 29.8 KG/M2 | HEIGHT: 72 IN | RESPIRATION RATE: 18 BRPM | TEMPERATURE: 98 F | DIASTOLIC BLOOD PRESSURE: 74 MMHG

## 2024-08-30 DIAGNOSIS — K21.9 GASTROESOPHAGEAL REFLUX DISEASE WITHOUT ESOPHAGITIS: ICD-10-CM

## 2024-08-30 DIAGNOSIS — E11.9 TYPE 2 DIABETES MELLITUS WITHOUT COMPLICATION, WITHOUT LONG-TERM CURRENT USE OF INSULIN: Primary | ICD-10-CM

## 2024-08-30 DIAGNOSIS — I10 PRIMARY HYPERTENSION: ICD-10-CM

## 2024-08-30 DIAGNOSIS — Z12.5 SPECIAL SCREENING FOR MALIGNANT NEOPLASM OF PROSTATE: ICD-10-CM

## 2024-08-30 DIAGNOSIS — E78.2 MIXED HYPERLIPIDEMIA: ICD-10-CM

## 2024-08-30 DIAGNOSIS — E78.49 OTHER HYPERLIPIDEMIA: ICD-10-CM

## 2024-08-30 DIAGNOSIS — I10 ESSENTIAL HYPERTENSION: ICD-10-CM

## 2024-08-30 DIAGNOSIS — E11.9 COMPREHENSIVE DIABETIC FOOT EXAMINATION, TYPE 2 DM, ENCOUNTER FOR: ICD-10-CM

## 2024-08-30 PROCEDURE — 1170F FXNL STATUS ASSESSED: CPT | Performed by: NURSE PRACTITIONER

## 2024-08-30 PROCEDURE — G0439 PPPS, SUBSEQ VISIT: HCPCS | Performed by: NURSE PRACTITIONER

## 2024-08-30 PROCEDURE — 3077F SYST BP >= 140 MM HG: CPT | Performed by: NURSE PRACTITIONER

## 2024-08-30 PROCEDURE — 3078F DIAST BP <80 MM HG: CPT | Performed by: NURSE PRACTITIONER

## 2024-08-30 PROCEDURE — 1160F RVW MEDS BY RX/DR IN RCRD: CPT | Performed by: NURSE PRACTITIONER

## 2024-08-30 PROCEDURE — 3044F HG A1C LEVEL LT 7.0%: CPT | Performed by: NURSE PRACTITIONER

## 2024-08-30 PROCEDURE — 1159F MED LIST DOCD IN RCRD: CPT | Performed by: NURSE PRACTITIONER

## 2024-08-30 RX ORDER — FENOFIBRATE 145 MG/1
145 TABLET, COATED ORAL DAILY
Qty: 90 TABLET | Refills: 3 | Status: SHIPPED | OUTPATIENT
Start: 2024-08-30

## 2024-08-30 RX ORDER — LOSARTAN POTASSIUM AND HYDROCHLOROTHIAZIDE 12.5; 5 MG/1; MG/1
1 TABLET ORAL DAILY
Qty: 90 TABLET | Refills: 3 | Status: SHIPPED | OUTPATIENT
Start: 2024-08-30

## 2024-08-30 RX ORDER — ESOMEPRAZOLE MAGNESIUM 40 MG/1
CAPSULE, DELAYED RELEASE ORAL
Qty: 90 CAPSULE | Refills: 3 | Status: SHIPPED | OUTPATIENT
Start: 2024-08-30

## 2024-08-30 RX ORDER — ROSUVASTATIN CALCIUM 40 MG/1
40 TABLET, COATED ORAL DAILY
Qty: 90 TABLET | Refills: 3 | Status: SHIPPED | OUTPATIENT
Start: 2024-08-30

## 2024-08-30 NOTE — TELEPHONE ENCOUNTER
Rx Refill Note  Requested Prescriptions     Pending Prescriptions Disp Refills    rosuvastatin (CRESTOR) 40 MG tablet [Pharmacy Med Name: ROSUVASTATIN CALCIUM 40 MG Tablet] 90 tablet 3     Sig: TAKE 1 TABLET EVERY DAY    fenofibrate (TRICOR) 145 MG tablet [Pharmacy Med Name: FENOFIBRATE 145 MG Tablet] 90 tablet 3     Sig: TAKE 1 TABLET EVERY DAY    esomeprazole (nexIUM) 40 MG capsule [Pharmacy Med Name: ESOMEPRAZOLE MAGNESIUM 40 MG Capsule Delayed Release] 90 capsule 3     Sig: TAKE 1 CAPSULE EVERY MORNING BEFORE BREAKFAST    losartan-hydrochlorothiazide (HYZAAR) 50-12.5 MG per tablet [Pharmacy Med Name: LOSARTAN POTASSIUM/HYDROCHLOROTHIAZIDE 50-12.5 MG Tablet] 90 tablet 3     Sig: TAKE 1 TABLET EVERY DAY      Last office visit with prescribing clinician: 8/30/2024   Last telemedicine visit with prescribing clinician: Visit date not found   Next office visit with prescribing clinician: 2/28/2025                         Would you like a call back once the refill request has been completed: [] Yes [] No    If the office needs to give you a call back, can they leave a voicemail: [] Yes [] No    Claista Fernandes RN  08/30/24, 12:25 CDT

## 2024-08-30 NOTE — PROGRESS NOTES
Subjective   The ABCs of the Annual Wellness Visit  Medicare Wellness Visit      Sree Bright is a 69 y.o. patient who presents for a Medicare Wellness Visit.    The following portions of the patient's history were reviewed and   updated as appropriate: allergies, current medications, past family history, past medical history, past social history, past surgical history, and problem list.    Compared to one year ago, the patient's physical   health is the same.  Compared to one year ago, the patient's mental   health is the same.    Recent Hospitalizations:  He was not admitted to the hospital during the last year.     Current Medical Providers:  Patient Care Team:  Melinda Olivares APRN as PCP - General (Nurse Practitioner)    Outpatient Medications Prior to Visit   Medication Sig Dispense Refill    Blood Glucose Monitoring Suppl (ONE TOUCH ULTRA 2) w/Device kit 1 each Daily. To test daily. 1 each 0    esomeprazole (nexIUM) 40 MG capsule TAKE 1 CAPSULE EVERY MORNING BEFORE BREAKFAST 90 capsule 3    fenofibrate (TRICOR) 145 MG tablet Take 1 tablet by mouth Daily. 90 tablet 3    glucose blood test strip Test blood glucose daily. 100 each 12    losartan-hydrochlorothiazide (Hyzaar) 50-12.5 MG per tablet Take 1 tablet by mouth Daily. 90 tablet 1    OneTouch Delica Lancets 33G misc 1 each Daily. 100 each 1    rosuvastatin (CRESTOR) 40 MG tablet Take 1 tablet by mouth Daily. 90 tablet 3    sildenafil (VIAGRA) 50 MG tablet TAKE 1 TABLET ONE TIME DAILY AS NEEDED FOR ERECTILE DYSFUNCTION 18 tablet 3    cephalexin (KEFLEX) 500 MG capsule       clindamycin (Cleocin) 300 MG capsule Take 1 capsule by mouth 3 (Three) Times a Day. 21 capsule 0    metFORMIN (GLUCOPHAGE) 1000 MG tablet Take 1 tablet by mouth 2 (Two) Times a Day With Meals. 180 tablet 1     No facility-administered medications prior to visit.     No opioid medication identified on active medication list. I have reviewed chart for other potential  high  "risk medication/s and harmful drug interactions in the elderly.      Aspirin is not on active medication list.  Aspirin use is indicated based on review of current medical condition/s. Pros and cons of this therapy have been discussed with this patient. Benefits of this medication outweigh potential harm.  Patient has been instructed to start taking this medication..    Patient Active Problem List   Diagnosis    Cough    Diabetes mellitus    Gastroesophageal reflux disease    Hyperlipidemia    Hypertensive disorder    Upper respiratory infection     Advance Care Planning Advance Directive is not on file.  ACP discussion was held with the patient during this visit. Patient has an advance directive (not in EMR), copy requested.            Objective   Vitals:    24 0801   BP: 144/74   Pulse: 76   Resp: 18   Temp: 98 °F (36.7 °C)   SpO2: 98%   Weight: 99.8 kg (220 lb)   Height: 182.9 cm (72\")       Estimated body mass index is 29.84 kg/m² as calculated from the following:    Height as of this encounter: 182.9 cm (72\").    Weight as of this encounter: 99.8 kg (220 lb).    BMI is >= 25 and <30. (Overweight) The following options were offered after discussion;: exercise counseling/recommendations and nutrition counseling/recommendations       Does the patient have evidence of cognitive impairment? No                                                                                               Health  Risk Assessment    Smoking Status:  Social History     Tobacco Use   Smoking Status Every Day    Current packs/day: 1.50    Average packs/day: 1.5 packs/day for 50.0 years (75.0 ttl pk-yrs)    Types: Cigarettes   Smokeless Tobacco Never     Alcohol Consumption:  Social History     Substance and Sexual Activity   Alcohol Use Yes       Fall Risk Screen  STEADI Fall Risk Assessment was completed, and patient is at LOW risk for falls.Assessment completed on:2024    Depression Screenin/29/2024     5:03 PM "   PHQ-2/PHQ-9 Depression Screening   Little Interest or Pleasure in Doing Things 0-->not at all   Feeling Down, Depressed or Hopeless 0-->not at all   PHQ-9: Brief Depression Severity Measure Score 0     Health Habits and Functional and Cognitive Screenin/29/2024     5:03 PM   Functional & Cognitive Status   Do you have difficulty preparing food and eating? No   Do you have difficulty bathing yourself, getting dressed or grooming yourself? No   Do you have difficulty using the toilet? No   Do you have difficulty moving around from place to place? No   Do you have trouble with steps or getting out of a bed or a chair? No   Current Diet Other   Dental Exam Not up to date   Eye Exam Up to date   Exercise (times per week) Other   Current Exercises Include Yard Work;House Cleaning   Do you need help using the phone?  No   Are you deaf or do you have serious difficulty hearing?  No   Do you need help to go to places out of walking distance? No   Do you need help shopping? No   Do you need help preparing meals?  No   Do you need help with housework?  No   Do you need help with laundry? No   Do you need help taking your medications? No   Do you need help managing money? No   Do you ever drive or ride in a car without wearing a seat belt? No   Have you felt unusual stress, anger or loneliness in the last month? No   Who do you live with? Spouse   If you need help, do you have trouble finding someone available to you? No   Have you been bothered in the last four weeks by sexual problems? No   Do you have difficulty concentrating, remembering or making decisions? No           Age-appropriate Screening Schedule:  Refer to the list below for future screening recommendations based on patient's age, sex and/or medical conditions. Orders for these recommended tests are listed in the plan section. The patient has been provided with a written plan.    Health Maintenance List  Health Maintenance   Topic Date Due    TDAP/TD  VACCINES (1 - Tdap) Never done    HEMOGLOBIN A1C  08/29/2024    URINE MICROALBUMIN  08/29/2024    BMI FOLLOWUP  08/29/2024    INFLUENZA VACCINE  08/01/2024    LUNG CANCER SCREENING  09/25/2024    ZOSTER VACCINE (1 of 2) 08/30/2024 (Originally 6/10/2007)    COLORECTAL CANCER SCREENING  08/29/2025 (Originally 1955)    DIABETIC EYE EXAM  01/22/2025    LIPID PANEL  02/28/2025    ANNUAL WELLNESS VISIT  08/30/2025    DIABETIC FOOT EXAM  08/30/2025    HEPATITIS C SCREENING  Completed    Pneumococcal Vaccine 65+  Completed    AAA SCREEN (ONE-TIME)  Completed    COVID-19 Vaccine  Discontinued                                                                                                                                                CMS Preventative Services Quick Reference  Risk Factors Identified During Encounter  Alcohol Misuse: Patient encouraged to limit alcohol use to no more than 1 standard alcoholic beverage per day. (12 ounce beer, 6 ounce wine, one shot liquor)  Tobacco Use/Dependance Risk (use dotphrase .tobaccocessation for documentation)    The above risks/problems have been discussed with the patient.  Pertinent information has been shared with the patient in the After Visit Summary.  An After Visit Summary and PPPS were made available to the patient.    Follow Up:   Next Medicare Wellness visit to be scheduled in 1 year.     Assessment & Plan  Type 2 diabetes mellitus without complication, without long-term current use of insulin  Diabetes is stable.   Continue current treatment regimen.  Diabetes will be reassessed in 6 months  Mixed hyperlipidemia   Lipid abnormalities are stable    Plan:  Continue same medication/s without change.      Discussed medication dosage, use, side effects, and goals of treatment in detail.    Counseled patient on lifestyle modifications to help control hyperlipidemia.     Patient Treatment Goals:   LDL goal is less than 70    Followup in 6 months.  Essential  hypertension  Hypertension is stable and controlled  Continue current treatment regimen.  Blood pressure will be reassessed in 6 months.  Special screening for malignant neoplasm of prostate    Comprehensive diabetic foot examination, type 2 DM, encounter for  Diabetes is worsening.   Recommended an ADA diet.  Regular aerobic exercise.  Diabetes will be reassessed in 6 months    Orders Placed This Encounter   Procedures    Comprehensive Metabolic Panel    Lipid Panel    Hemoglobin A1c    PSA SCREENING    MicroAlbumin, Urine, Random - Urine, Clean Catch    CBC & Differential             Follow Up:  Return in about 6 months (around 2/28/2025).

## 2024-08-31 LAB
ALBUMIN SERPL-MCNC: 4.6 G/DL (ref 3.9–4.9)
ALP SERPL-CCNC: 47 IU/L (ref 44–121)
ALT SERPL-CCNC: 16 IU/L (ref 0–44)
AST SERPL-CCNC: 19 IU/L (ref 0–40)
BASOPHILS # BLD AUTO: 0.1 X10E3/UL (ref 0–0.2)
BASOPHILS NFR BLD AUTO: 1 %
BILIRUB SERPL-MCNC: 0.5 MG/DL (ref 0–1.2)
BUN SERPL-MCNC: 16 MG/DL (ref 8–27)
BUN/CREAT SERPL: 15 (ref 10–24)
CALCIUM SERPL-MCNC: 9.6 MG/DL (ref 8.6–10.2)
CHLORIDE SERPL-SCNC: 101 MMOL/L (ref 96–106)
CHOLEST SERPL-MCNC: 185 MG/DL (ref 100–199)
CO2 SERPL-SCNC: 22 MMOL/L (ref 20–29)
CREAT SERPL-MCNC: 1.04 MG/DL (ref 0.76–1.27)
EGFRCR SERPLBLD CKD-EPI 2021: 79 ML/MIN/1.73
EOSINOPHIL # BLD AUTO: 0.2 X10E3/UL (ref 0–0.4)
EOSINOPHIL NFR BLD AUTO: 3 %
ERYTHROCYTE [DISTWIDTH] IN BLOOD BY AUTOMATED COUNT: 13.2 % (ref 11.6–15.4)
GLOBULIN SER CALC-MCNC: 2.5 G/DL (ref 1.5–4.5)
GLUCOSE SERPL-MCNC: 113 MG/DL (ref 70–99)
HBA1C MFR BLD: 6.7 % (ref 4.8–5.6)
HCT VFR BLD AUTO: 46.3 % (ref 37.5–51)
HDLC SERPL-MCNC: 50 MG/DL
HGB BLD-MCNC: 15.2 G/DL (ref 13–17.7)
IMM GRANULOCYTES # BLD AUTO: 0 X10E3/UL (ref 0–0.1)
IMM GRANULOCYTES NFR BLD AUTO: 0 %
LDLC SERPL CALC-MCNC: 120 MG/DL (ref 0–99)
LYMPHOCYTES # BLD AUTO: 1.5 X10E3/UL (ref 0.7–3.1)
LYMPHOCYTES NFR BLD AUTO: 23 %
MCH RBC QN AUTO: 32.1 PG (ref 26.6–33)
MCHC RBC AUTO-ENTMCNC: 32.8 G/DL (ref 31.5–35.7)
MCV RBC AUTO: 98 FL (ref 79–97)
MICROALBUMIN UR-MCNC: 6.8 UG/ML
MONOCYTES # BLD AUTO: 0.7 X10E3/UL (ref 0.1–0.9)
MONOCYTES NFR BLD AUTO: 11 %
NEUTROPHILS # BLD AUTO: 3.9 X10E3/UL (ref 1.4–7)
NEUTROPHILS NFR BLD AUTO: 62 %
PLATELET # BLD AUTO: 217 X10E3/UL (ref 150–450)
POTASSIUM SERPL-SCNC: 4.3 MMOL/L (ref 3.5–5.2)
PROT SERPL-MCNC: 7.1 G/DL (ref 6–8.5)
PSA SERPL-MCNC: 0.4 NG/ML (ref 0–4)
RBC # BLD AUTO: 4.74 X10E6/UL (ref 4.14–5.8)
SODIUM SERPL-SCNC: 138 MMOL/L (ref 134–144)
TRIGL SERPL-MCNC: 84 MG/DL (ref 0–149)
VLDLC SERPL CALC-MCNC: 15 MG/DL (ref 5–40)
WBC # BLD AUTO: 6.4 X10E3/UL (ref 3.4–10.8)

## 2024-10-24 ENCOUNTER — HOSPITAL ENCOUNTER (OUTPATIENT)
Dept: CT IMAGING | Facility: HOSPITAL | Age: 69
Discharge: HOME | End: 2024-10-24
Admitting: NURSE PRACTITIONER
Payer: MEDICARE

## 2024-10-24 DIAGNOSIS — Z87.891 PERSONAL HISTORY OF TOBACCO USE, PRESENTING HAZARDS TO HEALTH: ICD-10-CM

## 2024-10-24 PROCEDURE — 71271 CT THORAX LUNG CANCER SCR C-: CPT

## 2024-12-10 ENCOUNTER — OFFICE VISIT (OUTPATIENT)
Dept: INTERNAL MEDICINE | Facility: CLINIC | Age: 69
End: 2024-12-10
Payer: MEDICARE

## 2024-12-10 VITALS
WEIGHT: 225 LBS | HEART RATE: 80 BPM | OXYGEN SATURATION: 97 % | DIASTOLIC BLOOD PRESSURE: 82 MMHG | RESPIRATION RATE: 18 BRPM | BODY MASS INDEX: 30.48 KG/M2 | HEIGHT: 72 IN | SYSTOLIC BLOOD PRESSURE: 138 MMHG | TEMPERATURE: 98.2 F

## 2024-12-10 DIAGNOSIS — N40.0 PROSTATE HYPERTROPHY: Primary | ICD-10-CM

## 2024-12-10 DIAGNOSIS — K62.5 BRBPR (BRIGHT RED BLOOD PER RECTUM): ICD-10-CM

## 2024-12-10 RX ORDER — TAMSULOSIN HYDROCHLORIDE 0.4 MG/1
1 CAPSULE ORAL DAILY
Qty: 30 CAPSULE | Refills: 1 | Status: SHIPPED | OUTPATIENT
Start: 2024-12-10

## 2024-12-10 NOTE — PROGRESS NOTES
Answers submitted by the patient for this visit:  Primary Reason for Visit (Submitted on 12/9/2024)  What is the primary reason for your visit?: Problem Not Listed  Problem not listed (Submitted on 12/9/2024)  Chief Complaint: Other medical problem  Reason for appointment: Prostrate  abdominal pain: No  anorexia: No  joint pain: No  change in stool: Yes  chest pain: No  chills: No  nasal congestion: No  cough: No  diaphoresis: No  fatigue: No  fever: No  headaches: No  joint swelling: No  myalgias: No  nausea: No  neck pain: No  numbness: No  rash: No  sore throat: No  swollen glands: No  dysuria: No  vertigo: No  visual change: No  weakness: No  Onset: 1 to 6 months  Chronicity: new  Frequency: intermittently  Medications tried: None            Subjective     Chief Complaint   Patient presents with    Prostate Problem       History of Present Illness  The patient is a 69-year-old male who presents for evaluation of urinary retention and constipation.    He reports difficulty initiating urination while seated, although he can urinate without issue when standing. He experiences severe pain during urination, which he describes as a build-up of pressure that needs to be released. Despite these challenges, he is able to fully empty his bladder once he stands up. He has attempted to alleviate his symptoms by adjusting his pelvic position but has not found this to be particularly effective. His urinary stream is normal upon waking in the morning. He has not received an influenza vaccine.    He also reports difficulty with bowel movements, necessitating prolonged periods of sitting. He has observed bright red blood in his stool for the past 3 months, which he attributes to hemorrhoids. He reports no presence of blood in his urine.    IMMUNIZATIONS  He has not received an influenza vaccine.    Otherwise complete ROS reviewed and negative except as mentioned in the HPI.    Past Medical History:   Past Medical History:  "  Diagnosis Date    Erectile dysfunction ?    HL (hearing loss) ?    Hyperlipidemia     Hypertension     Type 2 diabetes mellitus     Visual impairment     Wear glasses     Past Surgical History:  Past Surgical History:   Procedure Laterality Date    VASECTOMY       Social History:  reports that he has been smoking cigarettes. He has a 75 pack-year smoking history. He has never used smokeless tobacco. He reports current alcohol use. He reports that he does not use drugs.    Family History: family history includes Cancer in his mother; Hypertension in his mother.       Allergies:  Allergies   Allergen Reactions    Lisinopril Cough     Medications:  Prior to Admission medications    Medication Sig Start Date End Date Taking? Authorizing Provider   Blood Glucose Monitoring Suppl (ONE TOUCH ULTRA 2) w/Device kit 1 each Daily. To test daily. 7/19/22   Melinda Olivares APRN   esomeprazole (nexIUM) 40 MG capsule TAKE 1 CAPSULE EVERY MORNING BEFORE BREAKFAST 8/30/24   Melinda Olivares APRN   fenofibrate (TRICOR) 145 MG tablet TAKE 1 TABLET EVERY DAY 8/30/24   Melinda Olivares APRN   glucose blood test strip Test blood glucose daily. 7/19/22   Melinda Olivares APRN   losartan-hydrochlorothiazide (HYZAAR) 50-12.5 MG per tablet TAKE 1 TABLET EVERY DAY 8/30/24   Melinda Olivares APRN   OneTouch Delica Lancets 33G misc 1 each Daily. 7/19/22   Melinda Olivares APRN   rosuvastatin (CRESTOR) 40 MG tablet TAKE 1 TABLET EVERY DAY 8/30/24   Melinda Olivares APRN   sildenafil (VIAGRA) 50 MG tablet TAKE 1 TABLET ONE TIME DAILY AS NEEDED FOR ERECTILE DYSFUNCTION 7/19/24   Melinda Olivares APRN       Objective     Vital Signs: /82   Pulse 80   Temp 98.2 °F (36.8 °C)   Resp 18   Ht 182.9 cm (72\")   Wt 102 kg (225 lb)   SpO2 97%   BMI 30.52 kg/m²     Physical Exam  A large hemorrhoid is present in the genitourinary area. The prostate feels slightly firm and may be " slightly enlarged.  Physical Exam  Vitals reviewed.   Constitutional:       Appearance: He is well-developed. He is obese.   HENT:      Head: Normocephalic and atraumatic.   Eyes:      Pupils: Pupils are equal, round, and reactive to light.   Neck:      Vascular: No JVD.   Cardiovascular:      Rate and Rhythm: Normal rate and regular rhythm.   Pulmonary:      Effort: Pulmonary effort is normal.      Breath sounds: Normal breath sounds.   Abdominal:      General: Bowel sounds are normal.      Palpations: Abdomen is soft.   Genitourinary:     Prostate: Enlarged.      Rectum: External hemorrhoid present.   Musculoskeletal:         General: No deformity.      Cervical back: Normal range of motion and neck supple.   Lymphadenopathy:      Cervical: No cervical adenopathy.   Skin:     General: Skin is warm and dry.   Neurological:      General: No focal deficit present.      Mental Status: He is alert and oriented to person, place, and time.   Psychiatric:         Behavior: Behavior normal.         Thought Content: Thought content normal.         Judgment: Judgment normal.     Results Reviewed:  Glucose   Date Value Ref Range Status   08/30/2024 113 (H) 70 - 99 mg/dL Final     BUN   Date Value Ref Range Status   08/30/2024 16 8 - 27 mg/dL Final     Creatinine   Date Value Ref Range Status   08/30/2024 1.04 0.76 - 1.27 mg/dL Final     Sodium   Date Value Ref Range Status   08/30/2024 138 134 - 144 mmol/L Final     Potassium   Date Value Ref Range Status   08/30/2024 4.3 3.5 - 5.2 mmol/L Final     Chloride   Date Value Ref Range Status   08/30/2024 101 96 - 106 mmol/L Final     Total CO2   Date Value Ref Range Status   08/30/2024 22 20 - 29 mmol/L Final     Calcium   Date Value Ref Range Status   08/30/2024 9.6 8.6 - 10.2 mg/dL Final     ALT (SGPT)   Date Value Ref Range Status   08/30/2024 16 0 - 44 IU/L Final     AST (SGOT)   Date Value Ref Range Status   08/30/2024 19 0 - 40 IU/L Final     WBC   Date Value Ref Range  Status   08/30/2024 6.4 3.4 - 10.8 x10E3/uL Final     Hematocrit   Date Value Ref Range Status   08/30/2024 46.3 37.5 - 51.0 % Final     Platelets   Date Value Ref Range Status   08/30/2024 217 150 - 450 x10E3/uL Final     Triglycerides   Date Value Ref Range Status   08/30/2024 84 0 - 149 mg/dL Final     HDL Cholesterol   Date Value Ref Range Status   08/30/2024 50 >39 mg/dL Final     LDL Chol Calc (NIH)   Date Value Ref Range Status   08/30/2024 120 (H) 0 - 99 mg/dL Final     Hemoglobin A1C   Date Value Ref Range Status   08/30/2024 6.7 (H) 4.8 - 5.6 % Final     Comment:              Prediabetes: 5.7 - 6.4           Diabetes: >6.4           Glycemic control for adults with diabetes: <7.0     02/29/2024 6.2 (A) 4.5 - 5.7 % Final       Results  Laboratory Studies  PSA was normal.      Assessment / Plan     Assessment/Plan:    Assessment & Plan  1. Urinary retention.  His prostate exhibits a slight firmness, suggesting a potential enlargement. His most recent Prostate-Specific Antigen (PSA) levels were within normal range. A prescription for Flomax will be provided to aid in reducing the size of the prostate. He has been informed that this medication may potentially exacerbate sexual dysfunction.    2. Constipation.  He has a significant hemorrhoid, which could be contributing to his urinary retention. He has been advised to incorporate daily use of MiraLAX or Metamucil into his regimen to maintain soft stools and facilitate easier bowel movements. This should alleviate some of the pressure on the prostate.    No follow-ups on file. unless patient needs to be seen sooner or acute issues arise.    Code Status: Full.   Patient or patient representative verbalized consent for the use of Ambient Listening during the visit with  TAI Payne for chart documentation. 12/10/2024  11:31 CST  I have discussed the patient results/orders and and plan/recommendation with them at today's visit.      Signed  by:    Melinda Olivares, APRN Date: 12/10/24

## 2025-02-28 ENCOUNTER — OFFICE VISIT (OUTPATIENT)
Dept: INTERNAL MEDICINE | Facility: CLINIC | Age: 70
End: 2025-02-28
Payer: MEDICARE

## 2025-02-28 VITALS
TEMPERATURE: 98 F | DIASTOLIC BLOOD PRESSURE: 82 MMHG | HEART RATE: 71 BPM | HEIGHT: 72 IN | WEIGHT: 229.6 LBS | SYSTOLIC BLOOD PRESSURE: 152 MMHG | BODY MASS INDEX: 31.1 KG/M2 | OXYGEN SATURATION: 97 % | RESPIRATION RATE: 12 BRPM

## 2025-02-28 DIAGNOSIS — E78.2 MIXED HYPERLIPIDEMIA: ICD-10-CM

## 2025-02-28 DIAGNOSIS — E11.9 TYPE 2 DIABETES MELLITUS WITHOUT COMPLICATION, WITHOUT LONG-TERM CURRENT USE OF INSULIN: Primary | ICD-10-CM

## 2025-02-28 DIAGNOSIS — I10 PRIMARY HYPERTENSION: ICD-10-CM

## 2025-02-28 LAB
EXPIRATION DATE: ABNORMAL
HBA1C MFR BLD: 6.7 % (ref 4.5–5.7)
Lab: ABNORMAL

## 2025-02-28 RX ORDER — METFORMIN HYDROCHLORIDE 500 MG/1
1000 TABLET, EXTENDED RELEASE ORAL
Qty: 180 TABLET | Refills: 1 | Status: SHIPPED | OUTPATIENT
Start: 2025-02-28

## 2025-02-28 NOTE — PROGRESS NOTES
Answers submitted by the patient for this visit:  Diabetes Questionnaire (Submitted on 2/21/2025)  Chief Complaint: Diabetes problem  Diabetes type: type 2  MedicAlert ID: No  Disease duration: 10 Years  Treatment compliance: most of the time  Symptom course: stable  Home blood tests: 1-2 x per day  High score: 110-130  Below 70: never  blurred vision: No  foot paresthesias: No  foot ulcerations: No  polydipsia: No  polyuria: No  weight loss: No  confusion: No  headaches: No  speech difficulty: No  sweats: No  tremors: No  Current diet: high fat/cholesterol  Meal planning: carbohydrate counting, calorie counting, avoidance of concentrated sweets  Exercise: never  Eye exam current: Yes  Sees podiatrist: No            Subjective     Chief Complaint   Patient presents with    Diabetes       History of Present Illness  The patient is a 69-year-old male who presents for evaluation of diabetes mellitus, hypertension, and hemorrhoids.    He has been managing his diabetes without medication since mid-December 2024, when he exhausted his supply of metformin. He reports no adverse effects from metformin and is open to resuming its use. His dietary habits have been generally healthy, with occasional indulgences such as a large salad with asparagus and shrimp pasta with cream sauce. His blood glucose levels have ranged from 120s to 168, with one instance of a reading of 159. He reports no symptoms of hyperglycemia or hypoglycemia, including chest pain or shortness of breath. He maintains a consistent level of physical activity and plans to increase his activity during favorable weather conditions.    He reports no current rectal bleeding and has found relief from Preparation H suppositories.    SOCIAL HISTORY  The patient smokes 1.5 to 2 packs a day.    MEDICATIONS  Past: Metformin      Patient's PMR from outside medical facility reviewed and noted.      Otherwise complete ROS reviewed and negative except as mentioned in the  HPI.    Past Medical History:   Past Medical History:   Diagnosis Date    Erectile dysfunction ?    HL (hearing loss) ?    Hyperlipidemia     Hypertension     Type 2 diabetes mellitus     Visual impairment     Wear glasses     Past Surgical History:  Past Surgical History:   Procedure Laterality Date    VASECTOMY       Social History:  reports that he has been smoking cigarettes. He has a 75 pack-year smoking history. He has never used smokeless tobacco. He reports current alcohol use. He reports that he does not use drugs.    Family History: family history includes Cancer in his mother; Hypertension in his mother.       Allergies:  Allergies   Allergen Reactions    Lisinopril Cough     Medications:  Prior to Admission medications    Medication Sig Start Date End Date Taking? Authorizing Provider   Blood Glucose Monitoring Suppl (ONE TOUCH ULTRA 2) w/Device kit 1 each Daily. To test daily. 7/19/22   Melinda Olivares APRN   esomeprazole (nexIUM) 40 MG capsule TAKE 1 CAPSULE EVERY MORNING BEFORE BREAKFAST 8/30/24   Melinda Olivares APRN   fenofibrate (TRICOR) 145 MG tablet TAKE 1 TABLET EVERY DAY 8/30/24   Melinda Olivares APRN   glucose blood test strip Test blood glucose daily. 7/19/22   Melinda Olivares APRN   losartan-hydrochlorothiazide (HYZAAR) 50-12.5 MG per tablet TAKE 1 TABLET EVERY DAY 8/30/24   Melinda Olivares APRN   OneTouch Delica Lancets 33G misc 1 each Daily. 7/19/22   Melinda Olivares APRN   rosuvastatin (CRESTOR) 40 MG tablet TAKE 1 TABLET EVERY DAY 8/30/24   Melinda Olivares APRN   sildenafil (VIAGRA) 50 MG tablet TAKE 1 TABLET ONE TIME DAILY AS NEEDED FOR ERECTILE DYSFUNCTION 7/19/24   Melinda Olivares APRN   tamsulosin (FLOMAX) 0.4 MG capsule 24 hr capsule Take 1 capsule by mouth Daily. 12/10/24   Melinda Olivares APRN       Objective     Vital Signs: /82   Pulse 71   Temp 98 °F (36.7 °C)   Resp 12   Ht 182.9 cm  "(72.01\")   Wt 104 kg (229 lb 9.6 oz)   SpO2 97%   BMI 31.13 kg/m²     Physical Exam  Lungs were auscultated.    Vital Signs  Blood pressure is elevated.  Physical Exam  Vitals reviewed.   Constitutional:       Appearance: He is well-developed. He is obese.   HENT:      Head: Normocephalic and atraumatic.   Eyes:      Pupils: Pupils are equal, round, and reactive to light.   Neck:      Vascular: No JVD.   Cardiovascular:      Rate and Rhythm: Normal rate and regular rhythm.   Pulmonary:      Effort: Pulmonary effort is normal.      Breath sounds: Normal breath sounds.   Abdominal:      General: Bowel sounds are normal.      Palpations: Abdomen is soft.   Musculoskeletal:         General: No deformity. Normal range of motion.      Cervical back: Normal range of motion and neck supple.   Lymphadenopathy:      Cervical: No cervical adenopathy.   Skin:     General: Skin is warm and dry.      Comments: dry   Neurological:      General: No focal deficit present.      Mental Status: He is alert and oriented to person, place, and time.   Psychiatric:         Behavior: Behavior normal.         Thought Content: Thought content normal.         Judgment: Judgment normal.        Results Reviewed:  Glucose   Date Value Ref Range Status   08/30/2024 113 (H) 70 - 99 mg/dL Final     BUN   Date Value Ref Range Status   08/30/2024 16 8 - 27 mg/dL Final     Creatinine   Date Value Ref Range Status   08/30/2024 1.04 0.76 - 1.27 mg/dL Final     Sodium   Date Value Ref Range Status   08/30/2024 138 134 - 144 mmol/L Final     Potassium   Date Value Ref Range Status   08/30/2024 4.3 3.5 - 5.2 mmol/L Final     Chloride   Date Value Ref Range Status   08/30/2024 101 96 - 106 mmol/L Final     Total CO2   Date Value Ref Range Status   08/30/2024 22 20 - 29 mmol/L Final     Calcium   Date Value Ref Range Status   08/30/2024 9.6 8.6 - 10.2 mg/dL Final     ALT (SGPT)   Date Value Ref Range Status   08/30/2024 16 0 - 44 IU/L Final     AST (SGOT) "   Date Value Ref Range Status   08/30/2024 19 0 - 40 IU/L Final     WBC   Date Value Ref Range Status   08/30/2024 6.4 3.4 - 10.8 x10E3/uL Final     Hematocrit   Date Value Ref Range Status   08/30/2024 46.3 37.5 - 51.0 % Final     Platelets   Date Value Ref Range Status   08/30/2024 217 150 - 450 x10E3/uL Final     Triglycerides   Date Value Ref Range Status   08/30/2024 84 0 - 149 mg/dL Final     HDL Cholesterol   Date Value Ref Range Status   08/30/2024 50 >39 mg/dL Final     LDL Chol Calc (NIH)   Date Value Ref Range Status   08/30/2024 120 (H) 0 - 99 mg/dL Final     Hemoglobin A1C   Date Value Ref Range Status   02/28/2025 6.7 (A) 4.5 - 5.7 % Final       Results  Laboratory Studies  A1c is 6.7.      Assessment / Plan     Assessment/Plan:  Diagnoses and all orders for this visit:    1. Type 2 diabetes mellitus without complication, without long-term current use of insulin (Primary)  -     Microalbumin / Creatinine Urine Ratio - Urine, Clean Catch  -     POC Glycosylated Hemoglobin (Hb A1C)  -     Comprehensive Metabolic Panel  -     CBC & Differential  -     metFORMIN ER (GLUCOPHAGE-XR) 500 MG 24 hr tablet; Take 2 tablets by mouth Daily With Breakfast.  Dispense: 180 tablet; Refill: 1    2. Primary hypertension  -     Comprehensive Metabolic Panel  -     CBC & Differential    3. Mixed hyperlipidemia  -     Lipid Panel        Assessment & Plan  1. Diabetes Mellitus.  His A1c level remains at 6.7, consistent with the reading from 6 months prior. He has not been taking any medication for his diabetes since mid-December after running out of metformin. He will be reinitiated on metformin therapy. A prescription for metformin will be sent to [facility] pharmacy. A microalbumin test will be conducted today. He is advised to maintain a balanced diet and increase physical activity.    2. Hypertension.  His blood pressure is noted to be high during this visit. He is advised to monitor his blood pressure regularly and  consider lifestyle modifications, including a balanced diet and increased physical activity.    3. Hemorrhoids.  He reports no more bleeding and has been using Preparation H suppositories, which seem to help. He is advised to continue using the suppositories as needed and to monitor for any recurrence of symptoms.    4. Hyperlipidemia.  A cholesterol panel will be ordered as it has been 6 months since the last check. He is advised to continue a diet low in saturated fats and high in healthy fats, such as those found in fish.    Follow-up  The patient will follow up in September for Medicare wellness.    Return in about 6 months (around 8/28/2025) for Medicare Wellness. unless patient needs to be seen sooner or acute issues arise.    Code Status: Full.   Patient or patient representative verbalized consent for the use of Ambient Listening during the visit with  TAI Payne for chart documentation. 2/28/2025  12:39 CST  I have discussed the patient results/orders and and plan/recommendation with them at today's visit.      Signed by:    TAI Payne Date: 02/28/25

## 2025-03-01 LAB
ALBUMIN SERPL-MCNC: 4.6 G/DL (ref 3.9–4.9)
ALBUMIN/CREAT UR: 22 MG/G CREAT (ref 0–29)
ALP SERPL-CCNC: 55 IU/L (ref 44–121)
ALT SERPL-CCNC: 16 IU/L (ref 0–44)
AST SERPL-CCNC: 24 IU/L (ref 0–40)
BASOPHILS # BLD AUTO: 0.1 X10E3/UL (ref 0–0.2)
BASOPHILS NFR BLD AUTO: 1 %
BILIRUB SERPL-MCNC: 0.4 MG/DL (ref 0–1.2)
BUN SERPL-MCNC: 18 MG/DL (ref 8–27)
BUN/CREAT SERPL: 20 (ref 10–24)
CALCIUM SERPL-MCNC: 9.2 MG/DL (ref 8.6–10.2)
CHLORIDE SERPL-SCNC: 99 MMOL/L (ref 96–106)
CHOLEST SERPL-MCNC: 192 MG/DL (ref 100–199)
CO2 SERPL-SCNC: 21 MMOL/L (ref 20–29)
CREAT SERPL-MCNC: 0.9 MG/DL (ref 0.76–1.27)
CREAT UR-MCNC: 118.2 MG/DL
EGFRCR SERPLBLD CKD-EPI 2021: 94 ML/MIN/1.73
EOSINOPHIL # BLD AUTO: 0.2 X10E3/UL (ref 0–0.4)
EOSINOPHIL NFR BLD AUTO: 3 %
ERYTHROCYTE [DISTWIDTH] IN BLOOD BY AUTOMATED COUNT: 13 % (ref 11.6–15.4)
GLOBULIN SER CALC-MCNC: 2.5 G/DL (ref 1.5–4.5)
GLUCOSE SERPL-MCNC: 110 MG/DL (ref 70–99)
HCT VFR BLD AUTO: 45.1 % (ref 37.5–51)
HDLC SERPL-MCNC: 48 MG/DL
HGB BLD-MCNC: 15.1 G/DL (ref 13–17.7)
IMM GRANULOCYTES # BLD AUTO: 0 X10E3/UL (ref 0–0.1)
IMM GRANULOCYTES NFR BLD AUTO: 0 %
LDLC SERPL CALC-MCNC: 124 MG/DL (ref 0–99)
LYMPHOCYTES # BLD AUTO: 1.6 X10E3/UL (ref 0.7–3.1)
LYMPHOCYTES NFR BLD AUTO: 25 %
MCH RBC QN AUTO: 31 PG (ref 26.6–33)
MCHC RBC AUTO-ENTMCNC: 33.5 G/DL (ref 31.5–35.7)
MCV RBC AUTO: 93 FL (ref 79–97)
MICROALBUMIN UR-MCNC: 25.9 UG/ML
MONOCYTES # BLD AUTO: 0.8 X10E3/UL (ref 0.1–0.9)
MONOCYTES NFR BLD AUTO: 12 %
NEUTROPHILS # BLD AUTO: 3.8 X10E3/UL (ref 1.4–7)
NEUTROPHILS NFR BLD AUTO: 59 %
PLATELET # BLD AUTO: 224 X10E3/UL (ref 150–450)
POTASSIUM SERPL-SCNC: 4.3 MMOL/L (ref 3.5–5.2)
PROT SERPL-MCNC: 7.1 G/DL (ref 6–8.5)
RBC # BLD AUTO: 4.87 X10E6/UL (ref 4.14–5.8)
SODIUM SERPL-SCNC: 138 MMOL/L (ref 134–144)
TRIGL SERPL-MCNC: 108 MG/DL (ref 0–149)
VLDLC SERPL CALC-MCNC: 20 MG/DL (ref 5–40)
WBC # BLD AUTO: 6.4 X10E3/UL (ref 3.4–10.8)

## 2025-04-17 ENCOUNTER — TELEPHONE (OUTPATIENT)
Dept: INTERNAL MEDICINE | Facility: CLINIC | Age: 70
End: 2025-04-17
Payer: MEDICARE

## 2025-04-17 NOTE — TELEPHONE ENCOUNTER
Subjective:   Sree Bright is a 69 y.o. male with hypertension. I called pt to ask him if he has been checking BP at home, and if so, what have the readings been.      Current Outpatient Medications   Medication Sig Dispense Refill    Blood Glucose Monitoring Suppl (ONE TOUCH ULTRA 2) w/Device kit 1 each Daily. To test daily. 1 each 0    esomeprazole (nexIUM) 40 MG capsule TAKE 1 CAPSULE EVERY MORNING BEFORE BREAKFAST 90 capsule 3    fenofibrate (TRICOR) 145 MG tablet TAKE 1 TABLET EVERY DAY 90 tablet 3    glucose blood test strip Test blood glucose daily. 100 each 12    losartan-hydrochlorothiazide (HYZAAR) 50-12.5 MG per tablet TAKE 1 TABLET EVERY DAY 90 tablet 3    metFORMIN ER (GLUCOPHAGE-XR) 500 MG 24 hr tablet Take 2 tablets by mouth Daily With Breakfast. 180 tablet 1    OneTouch Delica Lancets 33G misc 1 each Daily. 100 each 1    rosuvastatin (CRESTOR) 40 MG tablet TAKE 1 TABLET EVERY DAY 90 tablet 3    sildenafil (VIAGRA) 50 MG tablet TAKE 1 TABLET ONE TIME DAILY AS NEEDED FOR ERECTILE DYSFUNCTION 18 tablet 3    tamsulosin (FLOMAX) 0.4 MG capsule 24 hr capsule Take 1 capsule by mouth Daily. 30 capsule 1     No current facility-administered medications for this visit.      Pt does not perform home BP monitoring so does not have any recent readings.    Plan:   Pt stated he will try to monitor BP at home moving forward and it will hopefully be improved at his next office visit.

## 2025-05-29 ENCOUNTER — OFFICE VISIT (OUTPATIENT)
Dept: INTERNAL MEDICINE | Facility: CLINIC | Age: 70
End: 2025-05-29
Payer: MEDICARE

## 2025-05-29 VITALS
OXYGEN SATURATION: 98 % | HEART RATE: 92 BPM | RESPIRATION RATE: 18 BRPM | HEIGHT: 72 IN | WEIGHT: 224 LBS | BODY MASS INDEX: 30.34 KG/M2 | DIASTOLIC BLOOD PRESSURE: 70 MMHG | SYSTOLIC BLOOD PRESSURE: 136 MMHG

## 2025-05-29 DIAGNOSIS — S52.501A CLOSED FRACTURE OF DISTAL END OF RIGHT RADIUS, UNSPECIFIED FRACTURE MORPHOLOGY, INITIAL ENCOUNTER: Primary | ICD-10-CM

## 2025-05-29 DIAGNOSIS — M25.531 ACUTE PAIN OF RIGHT WRIST: Primary | ICD-10-CM

## 2025-05-29 RX ORDER — HYDROCODONE BITARTRATE AND ACETAMINOPHEN 5; 325 MG/1; MG/1
1 TABLET ORAL EVERY 4 HOURS PRN
Qty: 18 TABLET | Refills: 0 | Status: SHIPPED | OUTPATIENT
Start: 2025-05-29 | End: 2025-06-01

## 2025-05-29 NOTE — PROGRESS NOTES
"   Carlo Hayward MD  [facility]   Family Medicine  [address]  [place] [address]  Phone: [phone]  Fax: [phone]       Chief Complaint:  Chief Complaint   Patient presents with    Wrist Injury     Right side        History:  Sree Bright is a 69 y.o. male that is an established patient. He  is here for the above problems.    HPI:  Patient reports that he recently fell while attempting to uncover his pontoon boat hitting the ground and he believes that he landed on his right wrist causing severe pain.  Patient has some discomfort with moving the wrist but has full range of motion.  He has no weakness in the fingers or tingling or numbness upper extremity.  He reports no other injury no cough congestion shortness of air or chest pain.          reports that he has been smoking cigarettes. He has a 75 pack-year smoking history. He has never used smokeless tobacco. He reports current alcohol use. He reports that he does not use drugs.    Current Outpatient Medications   Medication Instructions    Blood Glucose Monitoring Suppl (ONE TOUCH ULTRA 2) w/Device kit 1 each, Not Applicable, Daily, To test daily.    esomeprazole (nexIUM) 40 MG capsule TAKE 1 CAPSULE EVERY MORNING BEFORE BREAKFAST    fenofibrate (TRICOR) 145 mg, Oral, Daily    glucose blood test strip Test blood glucose daily.    HYDROcodone-acetaminophen (NORCO) 5-325 MG per tablet 1 tablet, Oral, Every 4 Hours PRN    losartan-hydrochlorothiazide (HYZAAR) 50-12.5 MG per tablet 1 tablet, Oral, Daily    metFORMIN ER (GLUCOPHAGE-XR) 1,000 mg, Oral, Daily With Breakfast    OneTouch Delica Lancets 33G misc 1 each, Not Applicable, Daily    rosuvastatin (CRESTOR) 40 mg, Oral, Daily    sildenafil (VIAGRA) 50 MG tablet TAKE 1 TABLET ONE TIME DAILY AS NEEDED FOR ERECTILE DYSFUNCTION    tamsulosin (FLOMAX) 0.4 mg, Oral, Daily       OBJECTIVE:  /70   Pulse 92   Resp 18   Ht 182.9 cm (72\")   Wt 102 kg (224 lb)   SpO2 98%   " BMI 30.38 kg/m²    Physical Exam  Vitals and nursing note reviewed.   Musculoskeletal:      Right wrist: Swelling, tenderness and bony tenderness present. No lacerations, snuff box tenderness or crepitus. Normal range of motion. Normal pulse.      Right hand: Normal.   Neurological:      Sensory: Sensation is intact.      Motor: Motor function is intact.      Deep Tendon Reflexes: Reflexes are normal and symmetric.     Procedure    Orthopedic Injury Treatment    Date/Time: 5/29/2025 2:58 PM    Performed by: Carlo Hayward MD  Authorized by: Carlo Hayward MD  Injury location: wrist  Location details: right wrist  Injury type: fracture  Fracture type: distal radius  Pre-procedure neurovascular assessment: neurovascularly intact  Pre-procedure distal perfusion: normal  Pre-procedure neurological function: normal  Pre-procedure range of motion: normal    Anesthesia:  Local anesthesia used: no    Sedation:  Patient sedated: no    Manipulation performed: no  Immobilization: splint  Splint type: volar short arm  Supplies used: Ortho-Glass  Post-procedure neurovascular assessment: post-procedure neurovascularly intact  Post-procedure distal perfusion: normal  Post-procedure neurological function: normal  Post-procedure range of motion comment: Wrist immobilized fingers have good range of motion  Patient tolerance: patient tolerated the procedure well with no immediate complications          Results Reviewed:              Glucose   Date Value Ref Range Status   02/28/2025 110 (H) 70 - 99 mg/dL Final     BUN   Date Value Ref Range Status   02/28/2025 18 8 - 27 mg/dL Final     Creatinine   Date Value Ref Range Status   02/28/2025 0.90 0.76 - 1.27 mg/dL Final     Sodium   Date Value Ref Range Status   02/28/2025 138 134 - 144 mmol/L Final     Potassium   Date Value Ref Range Status   02/28/2025 4.3 3.5 - 5.2 mmol/L Final     Chloride   Date Value Ref Range Status   02/28/2025 99 96 - 106 mmol/L Final     Total CO2    Date Value Ref Range Status   02/28/2025 21 20 - 29 mmol/L Final     Calcium   Date Value Ref Range Status   02/28/2025 9.2 8.6 - 10.2 mg/dL Final     ALT (SGPT)   Date Value Ref Range Status   02/28/2025 16 0 - 44 IU/L Final     AST (SGOT)   Date Value Ref Range Status   02/28/2025 24 0 - 40 IU/L Final     WBC   Date Value Ref Range Status   02/28/2025 6.4 3.4 - 10.8 x10E3/uL Final     Hematocrit   Date Value Ref Range Status   02/28/2025 45.1 37.5 - 51.0 % Final     Platelets   Date Value Ref Range Status   02/28/2025 224 150 - 450 x10E3/uL Final     Triglycerides   Date Value Ref Range Status   02/28/2025 108 0 - 149 mg/dL Final     HDL Cholesterol   Date Value Ref Range Status   02/28/2025 48 >39 mg/dL Final     LDL Chol Calc (NIH)   Date Value Ref Range Status   02/28/2025 124 (H) 0 - 99 mg/dL Final     Hemoglobin A1C   Date Value Ref Range Status   02/28/2025 6.7 (A) 4.5 - 5.7 % Final     No orders to display      Assessment/Plan:     Diagnoses and all orders for this visit:    1. Closed fracture of distal end of right radius, unspecified fracture morphology, initial encounter (Primary)  -     HYDROcodone-acetaminophen (NORCO) 5-325 MG per tablet; Take 1 tablet by mouth Every 4 (Four) Hours As Needed for Moderate Pain for up to 3 days.  Dispense: 18 tablet; Refill: 0  -     Ambulatory Referral to Orthopedic Surgery  -     Orthopedic Injury Treatment        XR Wrist 3+ View Right  Result Date: 5/29/2025   1.  Acute, transverse fracture extending through the distal radius just proximal to the articular surface with questionable articular surface extension.  2.  Evidence of ulnar-lunate abutment.  3.  Mild widening of the distal radioulnar joint. Given this finding as well as the changes of ulnolunate abutment, I am suspicious that there may be a TFCC injury. This would be better evaluated with outpatient MRI if clinically warranted.  This report was signed and finalized on 5/29/2025 1:17 PM by Dr. Butler  MD Thai.               An After Visit Summary was printed and given to the patient at discharge.  No follow-ups on file.       There are no Patient Instructions on file for this visit.      Discussion: Since to be treated urgently for distal radius fracture which may have an intra-articular component of the ligament, technically making this complicated injury.  Ortho-Glass was applied as above and patient tolerated procedure well.  For short course of pain medication and warned him of side effects of sedation and dangers of taking the medication with alcohol.  He has a follow-up with orthopedics in the morning for more definitive splint and they require further imaging such as MRI.     Carlo Hayward MD.5/29/2025      Electronically signed.    EMR Dictation/Transcription disclaimer:   Some of this note may be an electronic transcription/translation of spoken language to printed text. The electronic translation of spoken language may permit erroneous, or at times, nonsensical words or phrases to be inadvertently transcribed; Although I have reviewed the note for such errors, some may still exist.

## 2025-05-30 ENCOUNTER — OFFICE VISIT (OUTPATIENT)
Age: 70
End: 2025-05-30
Payer: MEDICARE

## 2025-05-30 ENCOUNTER — HOSPITAL ENCOUNTER (OUTPATIENT)
Dept: GENERAL RADIOLOGY | Facility: HOSPITAL | Age: 70
Discharge: HOME | End: 2025-05-30
Payer: MEDICARE

## 2025-05-30 VITALS — WEIGHT: 224 LBS | BODY MASS INDEX: 30.34 KG/M2 | HEIGHT: 72 IN

## 2025-05-30 DIAGNOSIS — S52.571A OTHER CLOSED INTRA-ARTICULAR FRACTURE OF DISTAL END OF RIGHT RADIUS, INITIAL ENCOUNTER: ICD-10-CM

## 2025-05-30 DIAGNOSIS — M25.531 RIGHT WRIST PAIN: Primary | ICD-10-CM

## 2025-05-30 DIAGNOSIS — M25.531 RIGHT WRIST PAIN: ICD-10-CM

## 2025-05-30 PROCEDURE — 73100 X-RAY EXAM OF WRIST: CPT

## 2025-05-30 NOTE — PROGRESS NOTES
[facility] Sports Medicine  Alton Mendez MD, PhD  Asa Mendez PA-C    Chief Complaint:   Chief Complaint   Patient presents with    Right Wrist - Initial Evaluation     Patient presents today for right wrist fracture pain. X-rays performed at  on 05/29/2025, 5/30/25.           History of Present Illness:   The patient is a pleasant 69-year-old right-hand-dominant male who presents with an acute right wrist injury.  This occurred 1 day prior as a result of the fall.  He was initially seen at an outside facility where there were concerns over a wrist fracture.  He was subsequently placed in a splint and referred to our clinic for further evaluation.  He denies any loss of sensation, motor function, or constitutional symptoms.  The patient is a 1-1/2 pack/day smoker.  He has a history of type 2 diabetes.  He states his pain is relatively well-controlled for now.  Pain is worsened with any weightbearing to the right upper extremity      Referring Provider: No ref. provider found     Past Medical History:   Past Medical History:   Diagnosis Date    Erectile dysfunction ?    HL (hearing loss) ?    Hyperlipidemia     Hypertension     Type 2 diabetes mellitus     Visual impairment     Wear glasses        Past Surgical History:  Past Surgical History:   Procedure Laterality Date    VASECTOMY          Social History:   Social History     Socioeconomic History    Marital status:    Tobacco Use    Smoking status: Every Day     Current packs/day: 1.50     Average packs/day: 1.5 packs/day for 50.0 years (75.0 ttl pk-yrs)     Types: Cigarettes    Smokeless tobacco: Never   Vaping Use    Vaping status: Never Used   Substance and Sexual Activity    Alcohol use: Yes    Drug use: Never    Sexual activity: Yes     Partners: Female     Birth control/protection: Vasectomy        Medications:  Current Outpatient Medications   Medication Instructions    Blood Glucose Monitoring Suppl (ONE TOUCH ULTRA 2)  w/Device kit 1 each, Not Applicable, Daily, To test daily.    esomeprazole (nexIUM) 40 MG capsule TAKE 1 CAPSULE EVERY MORNING BEFORE BREAKFAST    fenofibrate (TRICOR) 145 mg, Oral, Daily    glucose blood test strip Test blood glucose daily.    HYDROcodone-acetaminophen (NORCO) 5-325 MG per tablet 1 tablet, Oral, Every 4 Hours PRN    losartan-hydrochlorothiazide (HYZAAR) 50-12.5 MG per tablet 1 tablet, Oral, Daily    metFORMIN ER (GLUCOPHAGE-XR) 1,000 mg, Oral, Daily With Breakfast    OneTouch Delica Lancets 33G misc 1 each, Not Applicable, Daily    rosuvastatin (CRESTOR) 40 mg, Oral, Daily    sildenafil (VIAGRA) 50 MG tablet TAKE 1 TABLET ONE TIME DAILY AS NEEDED FOR ERECTILE DYSFUNCTION    tamsulosin (FLOMAX) 0.4 mg, Oral, Daily        Allergies:  Allergies   Allergen Reactions    Lisinopril Cough       Vital Signs:  There were no vitals filed for this visit.  Body mass index is 30.38 kg/m².     Review of Systems:   Review of Systems    Physical Exam:  Vital signs reviewed.   General: No acute distress. Cooperative with exam.   Eyes: conjunctiva clear; pupils equally round and reactive  ENT: external ears and nose atraumatic; oropharynx clear  CV: no peripheral edema, 2+ distal pulses  Resp: normal respiratory effort, no use of accessory muscles  Skin: no rashes or wounds; normal turgor  Psych: mood and affect appropriate; recent and remote memory intact  Neuro: sensation to light touch intact, no focal neurological deficit  Musculoskeletal: On inspection of the right wrist there is a slight deformity to the volar aspect of the wrist and forearm as well as the dorsal aspect of the wrist.  No provocative test were done about the wrist due to known fracture in the distal radius.  He is nontender along the distal ulna.  He has full motion of the elbow.  Digital motion is mildly limited secondary to pain but he is able to flex and extend intact in radial, ulnar, median nerve distributions.  Pulses 2+, capillary fill  is less than 2    Physical Exam     Results Review:   XR --  XR - 2 Results   I have personally reviewed Results for orders placed during the hospital encounter of 05/30/25    XR WRIST 2 VW RIGHT 5/30/2025  I opted to take a subsequent AP and lateral where the radius and ulna for superimposed today which did demonstrate that the fracture appears to be stable although I am suspicious there is intra-articular involvement.    Results for orders placed during the hospital encounter of 05/29/25    XR WRIST 3+ VW RIGHT 5/29/2025   and my interpretation of the image is as follows: I reviewed an outside AP oblique and lateral of the right wrist where this demonstrates an essentially nondisplaced intra-articular distal radius fracture.  The lateral is tough to assess because the ulna is not superimposed on the radius.    Assessment:   Diagnoses and all orders for this visit:    1. Right wrist pain (Primary)  -     XR Wrist 2 View Right; Future    2. Other closed intra-articular fracture of distal end of right radius, initial encounter  -     Miscellaneous DME       Plan:  The patient was placed in a sugar-tong splint to immobilize the wrist and to limit pronation supination of the forearm as well as elbow motion.  We will attempt to treat this nonoperatively if we can keep the fracture stable should be able to definitively treat this nonsurgically.  I am slightly concerned with the distal radial ulnar joint and feel that the fracture is likely intra-articular.  Surgical management was also discussed with the patient and we both elected to continue treating this nonoperatively for now with very close follow-up.  Will plan to see him back in approximately 1 week with repeat x-rays to ensure stability of the fracture and if his swelling is improved at that point as well, would likely consider transitioning to an Exos splint.  Patient is agreeable with this plan, all questions were answered.  Follow-Up:   Return in about 1 week  (around 6/6/2025) for Recheck - repeat xrays.     Procedure:  Procedures   Splint application: The patient was placed in a supine position, stockinette was then used to cover the skin, then the patient was wrapped with cast padding.  An Ortho-Glass splint was then applied in a sugar-tong fashion.  He was then wrapped with an Ace wrap and finally Coban.  Neurovascular status was checked after this which was normal.  He tolerated this well and was placed in a sling    This document has been signed by AKBAR Villalobos on May 30, 2025 11:52 CDT

## 2025-06-06 ENCOUNTER — HOSPITAL ENCOUNTER (OUTPATIENT)
Dept: GENERAL RADIOLOGY | Facility: HOSPITAL | Age: 70
Discharge: HOME | End: 2025-06-06
Payer: MEDICARE

## 2025-06-06 ENCOUNTER — OFFICE VISIT (OUTPATIENT)
Age: 70
End: 2025-06-06
Payer: MEDICARE

## 2025-06-06 VITALS — BODY MASS INDEX: 30.34 KG/M2 | HEIGHT: 72 IN | WEIGHT: 224 LBS

## 2025-06-06 DIAGNOSIS — M25.531 RIGHT WRIST PAIN: ICD-10-CM

## 2025-06-06 DIAGNOSIS — S52.571A OTHER CLOSED INTRA-ARTICULAR FRACTURE OF DISTAL END OF RIGHT RADIUS, INITIAL ENCOUNTER: ICD-10-CM

## 2025-06-06 DIAGNOSIS — S52.571D OTHER CLOSED INTRA-ARTICULAR FRACTURE OF DISTAL END OF RIGHT RADIUS WITH ROUTINE HEALING, SUBSEQUENT ENCOUNTER: ICD-10-CM

## 2025-06-06 DIAGNOSIS — M25.531 RIGHT WRIST PAIN: Primary | ICD-10-CM

## 2025-06-06 PROCEDURE — 73110 X-RAY EXAM OF WRIST: CPT

## 2025-06-06 NOTE — PROGRESS NOTES
[facility] Sports Medicine  Alton Mendez MD, PhD  Asa Mendez PA-C    Chief Complaint:   Chief Complaint   Patient presents with    Right Wrist - Follow-up     Patient presents to the office today for right wrist fracture follow up. X-rays performed at [facility] on 06/06/2025.Swelling has improved.         History of Present Illness:   The patient is a pleasant 69-year-old who presents as a follow-up for a right distal radius fracture we have been treating nonoperatively.  He was initially placed in a sugar-tong splint.  He states his swelling and pain have improved significantly.  He is here today for repeat x-rays to ensure stability of the fracture.  No new complaints at today's visit  Referring Provider: No ref. provider found     Past Medical History:   Past Medical History:   Diagnosis Date    Erectile dysfunction ?    HL (hearing loss) ?    Hyperlipidemia     Hypertension     Type 2 diabetes mellitus     Visual impairment     Wear glasses        Past Surgical History:  Past Surgical History:   Procedure Laterality Date    VASECTOMY          Social History:   Social History     Socioeconomic History    Marital status:    Tobacco Use    Smoking status: Every Day     Current packs/day: 1.50     Average packs/day: 1.5 packs/day for 50.0 years (75.0 ttl pk-yrs)     Types: Cigarettes    Smokeless tobacco: Never   Vaping Use    Vaping status: Never Used   Substance and Sexual Activity    Alcohol use: Yes    Drug use: Never    Sexual activity: Yes     Partners: Female     Birth control/protection: Vasectomy        Medications:  Current Outpatient Medications   Medication Instructions    Blood Glucose Monitoring Suppl (ONE TOUCH ULTRA 2) w/Device kit 1 each, Not Applicable, Daily, To test daily.    esomeprazole (nexIUM) 40 MG capsule TAKE 1 CAPSULE EVERY MORNING BEFORE BREAKFAST    fenofibrate (TRICOR) 145 mg, Oral, Daily    glucose blood test strip Test blood glucose daily.     losartan-hydrochlorothiazide (HYZAAR) 50-12.5 MG per tablet 1 tablet, Oral, Daily    metFORMIN ER (GLUCOPHAGE-XR) 1,000 mg, Oral, Daily With Breakfast    OneTouch Delica Lancets 33G misc 1 each, Not Applicable, Daily    rosuvastatin (CRESTOR) 40 mg, Oral, Daily    sildenafil (VIAGRA) 50 MG tablet TAKE 1 TABLET ONE TIME DAILY AS NEEDED FOR ERECTILE DYSFUNCTION    tamsulosin (FLOMAX) 0.4 mg, Oral, Daily        Allergies:  Allergies   Allergen Reactions    Lisinopril Cough       Vital Signs:  There were no vitals filed for this visit.  Body mass index is 30.38 kg/m².     Review of Systems:   Review of Systems    Physical Exam:  Vital signs reviewed.   General: No acute distress. Cooperative with exam.   Eyes: conjunctiva clear; pupils equally round and reactive  ENT: external ears and nose atraumatic; oropharynx clear  CV: no peripheral edema, 2+ distal pulses  Resp: normal respiratory effort, no use of accessory muscles  Skin: no rashes or wounds; normal turgor  Psych: mood and affect appropriate; recent and remote memory intact  Neuro: sensation to light touch intact, no focal neurological deficit  Musculoskeletal: On inspection of the patient's right wrist is swelling has significantly improved.  He has intact elbow range of motion.  There is improved range of motion with pronation and supination.  He is  to palpation along the distal radius region.  Neurovascular intact right upper extremity    Physical Exam     Results Review:   I personally reviewed an AP oblique and lateral of the patient's right wrist which demonstrates a stable transverse fracture which may extend to the articular surface, best seen on the lateral view.  Image quality is adequate.  Assessment:   Diagnoses and all orders for this visit:    1. Right wrist pain (Primary)  -     XR Wrist 3+ View Right; Future    2. Other closed intra-articular fracture of distal end of right radius with routine healing, subsequent encounter  -     XR  Wrist 3+ View Right; Future  -     Miscellaneous DME       Plan:  Based on patient's current x-rays, fracture appears to be stable and well aligned.  Will continue nonoperative management.  Will transition over to an Exos splint for protection and immobilization for 2 to 3 weeks and then can begin some range of motion as we see callus formation on the x-rays.  Discussed smoking cessation/reduction.  Discussed blood glucose management given type 2 diabetes which appears to be relatively well-controlled per previous A1c.  Patient is agreeable with this plan, all questions were answered    Follow-Up:   Return in about 3 weeks (around 6/27/2025) for Recheck.     Procedure:  Procedures       This document has been signed by AKBAR Villalobos on June 6, 2025 09:57 CDT

## 2025-06-24 ENCOUNTER — OFFICE VISIT (OUTPATIENT)
Age: 70
End: 2025-06-24
Payer: MEDICARE

## 2025-06-24 ENCOUNTER — HOSPITAL ENCOUNTER (OUTPATIENT)
Dept: GENERAL RADIOLOGY | Facility: HOSPITAL | Age: 70
Discharge: HOME | End: 2025-06-24
Admitting: PHYSICIAN ASSISTANT
Payer: MEDICARE

## 2025-06-24 VITALS — WEIGHT: 224 LBS | HEIGHT: 72 IN | BODY MASS INDEX: 30.34 KG/M2

## 2025-06-24 DIAGNOSIS — S52.571D OTHER CLOSED INTRA-ARTICULAR FRACTURE OF DISTAL END OF RIGHT RADIUS WITH ROUTINE HEALING, SUBSEQUENT ENCOUNTER: ICD-10-CM

## 2025-06-24 DIAGNOSIS — S52.571D OTHER CLOSED INTRA-ARTICULAR FRACTURE OF DISTAL END OF RIGHT RADIUS WITH ROUTINE HEALING, SUBSEQUENT ENCOUNTER: Primary | ICD-10-CM

## 2025-06-24 PROCEDURE — 1159F MED LIST DOCD IN RCRD: CPT | Performed by: PHYSICIAN ASSISTANT

## 2025-06-24 PROCEDURE — 73110 X-RAY EXAM OF WRIST: CPT

## 2025-06-24 PROCEDURE — 99214 OFFICE O/P EST MOD 30 MIN: CPT | Performed by: PHYSICIAN ASSISTANT

## 2025-06-24 PROCEDURE — 1160F RVW MEDS BY RX/DR IN RCRD: CPT | Performed by: PHYSICIAN ASSISTANT

## 2025-06-24 NOTE — PROGRESS NOTES
[facility] Sports Medicine  Alton Mendez MD, PhD  Asa Mendez PA-C    Chief Complaint:   Chief Complaint   Patient presents with    Right Wrist - Follow-up     Patient presents to the office today for follow up for closed intra-articular fracture of distal end of right radius. X-rays performed at [facility] on 06/26/2025.        History of Present Illness:   The patient is a very pleasant 70-year-old follow-up for a transverse intra-articular distal radius fracture we have been treating nonoperatively.  Overall he is doing well.  He has been intermittently removing his brace for range of motion and states his pain continues to improve every day as does his function overall.  He has no new complaints or concerns at today's visit.   Referring Provider: No ref. provider found     Past Medical History:   Past Medical History:   Diagnosis Date    Erectile dysfunction ?    HL (hearing loss) ?    Hyperlipidemia     Hypertension     Type 2 diabetes mellitus     Visual impairment     Wear glasses        Past Surgical History:  Past Surgical History:   Procedure Laterality Date    VASECTOMY          Social History:   Social History     Socioeconomic History    Marital status:    Tobacco Use    Smoking status: Every Day     Current packs/day: 1.50     Average packs/day: 1.5 packs/day for 50.0 years (75.0 ttl pk-yrs)     Types: Cigarettes    Smokeless tobacco: Never   Vaping Use    Vaping status: Never Used   Substance and Sexual Activity    Alcohol use: Yes    Drug use: Never    Sexual activity: Yes     Partners: Female     Birth control/protection: Vasectomy        Medications:  Current Outpatient Medications   Medication Instructions    Blood Glucose Monitoring Suppl (ONE TOUCH ULTRA 2) w/Device kit 1 each, Not Applicable, Daily, To test daily.    esomeprazole (nexIUM) 40 MG capsule TAKE 1 CAPSULE EVERY MORNING BEFORE BREAKFAST    fenofibrate (TRICOR) 145 mg, Oral, Daily    glucose blood test strip Test  blood glucose daily.    losartan-hydrochlorothiazide (HYZAAR) 50-12.5 MG per tablet 1 tablet, Oral, Daily    metFORMIN ER (GLUCOPHAGE-XR) 1,000 mg, Oral, Daily With Breakfast    OneTouch Delica Lancets 33G misc 1 each, Not Applicable, Daily    rosuvastatin (CRESTOR) 40 mg, Oral, Daily    sildenafil (VIAGRA) 50 MG tablet TAKE 1 TABLET ONE TIME DAILY AS NEEDED FOR ERECTILE DYSFUNCTION    tamsulosin (FLOMAX) 0.4 mg, Oral, Daily        Allergies:  Allergies   Allergen Reactions    Lisinopril Cough       Vital Signs:  There were no vitals filed for this visit.  Body mass index is 30.38 kg/m².     Review of Systems:   Review of Systems    Physical Exam:  Vital signs reviewed.   General: No acute distress. Cooperative with exam.   Eyes: conjunctiva clear; pupils equally round and reactive  ENT: external ears and nose atraumatic; oropharynx clear  CV: no peripheral edema, 2+ distal pulses  Resp: normal respiratory effort, no use of accessory muscles  Skin: no rashes or wounds; normal turgor  Psych: mood and affect appropriate; recent and remote memory intact  Neuro: sensation to light touch intact, no focal neurological deficit  Musculoskeletal: On inspection of the patient's right wrist there is minimal swelling.  No erythema or ecchymosis present.  Minimal joint range of motion deficit is present with flexion extension.   strength is intact, 5 out of 5.  Neurovascular intact right upper extremity    Physical Exam     Results Review:   XR --  XR - 1 Result   I have personally reviewed Results for orders placed during the hospital encounter of 06/24/25    XR WRIST 3+ VW RIGHT 6/24/2025   and my interpretation of the image is as follows: AP oblique and lateral of the patient's right wrist was taken in our facility and reviewed by me.  This demonstrates a stable healing fracture involving the distal radius region.  There is incomplete healing.  Image quality is adequate      Assessment:   Diagnoses and all orders for this  visit:    1. Other closed intra-articular fracture of distal end of right radius with routine healing, subsequent encounter (Primary)       Plan:  Will plan to transition the patient to a Velcro removable wrist brace to protect the area but allow more mobility in an anticipation of a return to baseline level of function in approximately 2 to 3 weeks as the fracture continues to heal.  Discussed following up primarily on a as needed basis unless pain or swelling return at which point would need repeat x-rays.  Otherwise I do not feel that repeat x-rays are warranted as the fracture is stable and continues to heal.  The patient is agreeable with this plan, all questions were answered.    Follow-Up:   Return if symptoms worsen or fail to improve.     Procedure:  Procedures       This document has been signed by AKBAR Villalobos on June 24, 2025 13:09 CDT

## 2025-08-01 RX ORDER — SILDENAFIL 50 MG/1
TABLET, FILM COATED ORAL
Qty: 18 TABLET | Refills: 3 | Status: SHIPPED | OUTPATIENT
Start: 2025-08-01